# Patient Record
Sex: FEMALE | Race: WHITE
[De-identification: names, ages, dates, MRNs, and addresses within clinical notes are randomized per-mention and may not be internally consistent; named-entity substitution may affect disease eponyms.]

---

## 2019-02-26 ENCOUNTER — HOSPITAL ENCOUNTER (OUTPATIENT)
Dept: HOSPITAL 75 - RAD FS | Age: 74
End: 2019-02-26
Attending: NURSE PRACTITIONER
Payer: COMMERCIAL

## 2019-02-26 DIAGNOSIS — M19.012: Primary | ICD-10-CM

## 2019-02-26 PROCEDURE — 73030 X-RAY EXAM OF SHOULDER: CPT

## 2019-02-26 NOTE — DIAGNOSTIC IMAGING REPORT
Indication: Worsening pain.



Findings:  The severe glenohumeral arthritis with bone on bone

and joint space obliteration essentially present with articular

sclerosis, osteophytes and prominent subcortical cysts in the

humeral head.  AC joint appeared unremarkable.  No opaque loose

body is found.



Impression:  Severe arthritic changes to the glenohumeral joint



Dictated by: 



  Dictated on workstation # [id number]

## 2019-12-06 ENCOUNTER — HOSPITAL ENCOUNTER (OUTPATIENT)
Dept: HOSPITAL 75 - 4THO | Age: 74
End: 2019-12-06
Attending: EMERGENCY MEDICINE
Payer: COMMERCIAL

## 2019-12-06 ENCOUNTER — HOSPITAL ENCOUNTER (EMERGENCY)
Dept: HOSPITAL 75 - ER | Age: 74
Discharge: HOME | End: 2019-12-06
Payer: COMMERCIAL

## 2019-12-06 VITALS — HEIGHT: 57.09 IN | BODY MASS INDEX: 40 KG/M2 | WEIGHT: 185.41 LBS

## 2019-12-06 VITALS — SYSTOLIC BLOOD PRESSURE: 128 MMHG | DIASTOLIC BLOOD PRESSURE: 82 MMHG

## 2019-12-06 VITALS — DIASTOLIC BLOOD PRESSURE: 72 MMHG | SYSTOLIC BLOOD PRESSURE: 106 MMHG

## 2019-12-06 VITALS — BODY MASS INDEX: 37.84 KG/M2 | HEIGHT: 58.66 IN | WEIGHT: 185.19 LBS

## 2019-12-06 VITALS — DIASTOLIC BLOOD PRESSURE: 72 MMHG | SYSTOLIC BLOOD PRESSURE: 117 MMHG

## 2019-12-06 VITALS — DIASTOLIC BLOOD PRESSURE: 66 MMHG | SYSTOLIC BLOOD PRESSURE: 109 MMHG

## 2019-12-06 VITALS — SYSTOLIC BLOOD PRESSURE: 124 MMHG | DIASTOLIC BLOOD PRESSURE: 82 MMHG

## 2019-12-06 VITALS — DIASTOLIC BLOOD PRESSURE: 66 MMHG | SYSTOLIC BLOOD PRESSURE: 126 MMHG

## 2019-12-06 VITALS — DIASTOLIC BLOOD PRESSURE: 69 MMHG | SYSTOLIC BLOOD PRESSURE: 117 MMHG

## 2019-12-06 VITALS — DIASTOLIC BLOOD PRESSURE: 75 MMHG | SYSTOLIC BLOOD PRESSURE: 125 MMHG

## 2019-12-06 VITALS — DIASTOLIC BLOOD PRESSURE: 70 MMHG | SYSTOLIC BLOOD PRESSURE: 118 MMHG

## 2019-12-06 DIAGNOSIS — Z79.51: ICD-10-CM

## 2019-12-06 DIAGNOSIS — D64.9: Primary | ICD-10-CM

## 2019-12-06 DIAGNOSIS — E87.71: Primary | ICD-10-CM

## 2019-12-06 LAB
%HYPO/RBC NFR BLD AUTO: (no result) %
ALBUMIN SERPL-MCNC: 4.2 GM/DL (ref 3.2–4.5)
ALP SERPL-CCNC: 54 U/L (ref 40–136)
ALT SERPL-CCNC: 12 U/L (ref 0–55)
ANISOCYTOSIS BLD QL SMEAR: (no result)
APTT PPP: YELLOW S
BACTERIA #/AREA URNS HPF: NEGATIVE /HPF
BASOPHILS # BLD AUTO: 0 10^3/UL (ref 0–0.1)
BASOPHILS NFR BLD AUTO: 0 % (ref 0–10)
BASOPHILS NFR BLD MANUAL: 1 %
BILIRUB SERPL-MCNC: 0.3 MG/DL (ref 0.1–1)
BILIRUB UR QL STRIP: NEGATIVE
BUN/CREAT SERPL: 16
CALCIUM SERPL-MCNC: 9.3 MG/DL (ref 8.5–10.1)
CHLORIDE SERPL-SCNC: 107 MMOL/L (ref 98–107)
CO2 SERPL-SCNC: 27 MMOL/L (ref 21–32)
CREAT SERPL-MCNC: 0.7 MG/DL (ref 0.6–1.3)
EOSINOPHIL # BLD AUTO: 0.1 10^3/UL (ref 0–0.3)
EOSINOPHIL NFR BLD AUTO: 1 % (ref 0–10)
EOSINOPHIL NFR BLD MANUAL: 1 %
ERYTHROCYTE [DISTWIDTH] IN BLOOD BY AUTOMATED COUNT: 20.4 % (ref 10–14.5)
FIBRINOGEN PPP-MCNC: CLEAR MG/DL
GFR SERPLBLD BASED ON 1.73 SQ M-ARVRAT: > 60 ML/MIN
GLUCOSE SERPL-MCNC: 134 MG/DL (ref 70–105)
GLUCOSE UR STRIP-MCNC: NEGATIVE MG/DL
HCT VFR BLD CALC: 29 % (ref 35–52)
HGB BLD-MCNC: 7.9 G/DL (ref 11.5–16)
INR PPP: 1 (ref 0.8–1.4)
KETONES UR QL STRIP: NEGATIVE
LEUKOCYTE ESTERASE UR QL STRIP: NEGATIVE
LYMPHOCYTES # BLD AUTO: 0.8 X 10^3 (ref 1–4)
LYMPHOCYTES NFR BLD AUTO: 8 % (ref 12–44)
MANUAL DIFFERENTIAL PERFORMED BLD QL: YES
MCH RBC QN AUTO: 18 PG (ref 25–34)
MCHC RBC AUTO-ENTMCNC: 27 G/DL (ref 32–36)
MCV RBC AUTO: 68 FL (ref 80–99)
MICROCYTES BLD QL SMEAR: (no result)
MONOCYTES # BLD AUTO: 0.5 X 10^3 (ref 0–1)
MONOCYTES NFR BLD AUTO: 5 % (ref 0–12)
MONOCYTES NFR BLD: 7 %
NEUTROPHILS # BLD AUTO: 9 X 10^3 (ref 1.8–7.8)
NEUTROPHILS NFR BLD AUTO: 86 % (ref 42–75)
NEUTS BAND NFR BLD MANUAL: 83 %
NITRITE UR QL STRIP: NEGATIVE
PH UR STRIP: 7.5 [PH] (ref 5–9)
PLATELET # BLD: 265 10^3/UL (ref 130–400)
PMV BLD AUTO: 9.1 FL (ref 7.4–10.4)
POIKILOCYTOSIS BLD QL SMEAR: SLIGHT
POLYCHROMASIA BLD QL SMEAR: SLIGHT
POTASSIUM SERPL-SCNC: 3.4 MMOL/L (ref 3.6–5)
PROT SERPL-MCNC: 6.6 GM/DL (ref 6.4–8.2)
PROT UR QL STRIP: NEGATIVE
PROTHROMBIN TIME: 14.1 SEC (ref 12.2–14.7)
SODIUM SERPL-SCNC: 146 MMOL/L (ref 135–145)
SP GR UR STRIP: 1.01 (ref 1.02–1.02)
SQUAMOUS #/AREA URNS HPF: (no result) /HPF
VARIANT LYMPHS NFR BLD MANUAL: 8 %
WBC # BLD AUTO: 10.5 10^3/UL (ref 4.3–11)
WBC #/AREA URNS HPF: (no result) /HPF

## 2019-12-06 PROCEDURE — 86900 BLOOD TYPING SEROLOGIC ABO: CPT

## 2019-12-06 PROCEDURE — 85027 COMPLETE CBC AUTOMATED: CPT

## 2019-12-06 PROCEDURE — 85610 PROTHROMBIN TIME: CPT

## 2019-12-06 PROCEDURE — 36430 TRANSFUSION BLD/BLD COMPNT: CPT

## 2019-12-06 PROCEDURE — 71045 X-RAY EXAM CHEST 1 VIEW: CPT

## 2019-12-06 PROCEDURE — 86922 COMPATIBILITY TEST ANTIGLOB: CPT

## 2019-12-06 PROCEDURE — 72125 CT NECK SPINE W/O DYE: CPT

## 2019-12-06 PROCEDURE — 51701 INSERT BLADDER CATHETER: CPT

## 2019-12-06 PROCEDURE — 70450 CT HEAD/BRAIN W/O DYE: CPT

## 2019-12-06 PROCEDURE — 86870 RBC ANTIBODY IDENTIFICATION: CPT

## 2019-12-06 PROCEDURE — 36415 COLL VENOUS BLD VENIPUNCTURE: CPT

## 2019-12-06 PROCEDURE — 86902 BLOOD TYPE ANTIGEN DONOR EA: CPT

## 2019-12-06 PROCEDURE — 85007 BL SMEAR W/DIFF WBC COUNT: CPT

## 2019-12-06 PROCEDURE — 81000 URINALYSIS NONAUTO W/SCOPE: CPT

## 2019-12-06 PROCEDURE — 86850 RBC ANTIBODY SCREEN: CPT

## 2019-12-06 PROCEDURE — 80053 COMPREHEN METABOLIC PANEL: CPT

## 2019-12-06 PROCEDURE — 86901 BLOOD TYPING SEROLOGIC RH(D): CPT

## 2019-12-06 NOTE — NUR
AFTER DISCHARGE PT WILL GO TO ROOM 512 FOR OUTPATIENT BLOOD TRANSFUSION. REPORT 
TO JUAN FRANCISCO SANDERS.

## 2019-12-06 NOTE — NUR
THIS RN VERIFIED PT NAME, , MEDICAL RECORD NUMBER, BBK ID#, BLOOD TYPE, UNIT NUMBER, 
BLOOD TYPE OF UNIT, AND COMPATIBILITY W/ BLOOD BANK STAFF. 1UNIT PRBC FOR THIS PT REMOVED 
FROM BLOOD BANK BY THIS RN.

## 2019-12-06 NOTE — ED GENERAL
General


Chief Complaint:  Head/Cervical Problems


Stated Complaint:  FALL/HEAD INJ


Nursing Triage Note:  


PATIENT STATES THAT SHE FELL TWICE TODAY, FAMILY STATES THAT SHE IS HAVING 


TROUBLE PUTTING WORDS TOGETHER AND IS CONFUSED WHICH IS VERY OUT OF THE ORDINARY




FOR HER.


Nursing Sepsis Screen:  No Definite Risk


Source of Information:  Patient


Exam Limitations:  No Limitations





History of Present Illness


Date Seen by Provider:  Dec 6, 2019


Time Seen by Provider:  16:30


Initial Comments


To Er by POV with c/o fall x2 today related to left leg "giving out". Hx of low 

back pain with intermittent sciatica down left side. Has had intermittent 

confusion.


Timing/Duration:  12-24 Hours


Severity:  Moderate


Associated Systoms:  Denies Symptoms





Allergies and Home Medications


Allergies


Coded Allergies:  


     No Known Drug Allergies (Unverified , 11/5/14)





Home Medications


Calcium Carbonate/Vitamin D3 1 Each Tablet, 1 TAB PO BID, (Reported)


Diphenhydramine Hcl 25 Mg Tablet, 25 MG PO BID, (Reported)


Flaxseed 1,000 Mg Capsule, 1,000 MG PO DAILY, (Reported)


Fluticasone Propionate 16 Gm Naspr, 2 SPRAYS NSEACH DAILY, (Reported)


Gabapentin 300 Mg Capsule, 300 MG PO TID, (Reported)


Loratadine 10 Mg Capsule, 10 MG PO DAILY, (Reported)


Omega-3 Fatty Acids/Fish Oil 1 Each Capsule, 1 EACH PO DAILY, (Reported)


Oxycodone Hcl/Acetaminophen 1 Each Tablet, 1 TAB PO Q4H PRN for PAIN, (Reported)


   PRN PAIN 


Pantoprazole Sod 40 Mg Tab, 40 MG PO DAILY, (Reported)


Ranitidine Hcl 150 Mg Capsule, 150 MG PO HS, (Reported)


Simvastatin 20 Mg Tablet, 20 MG PO HS, (Reported)


Tramadol Hcl 50 Mg Tablet, 50 MG PO Q6H PRN for PAIN, (Reported)


   PRN PAIN 


Vitamin B Complex 1 Cap Capsule, 1 CAP PO DAILY, (Reported)





Patient Home Medication List


Home Medication List Reviewed:  Yes





Review of Systems


Review of Systems


Constitutional:  see HPI


EENTM:  see HPI


Respiratory:  no symptoms reported


Cardiovascular:  no symptoms reported


Genitourinary:  no symptoms reported


Musculoskeletal:  no symptoms reported


Skin:  no symptoms reported


Psychiatric/Neurological:  No Symptoms Reported


Hematologic/Lymphatic:  No Symptoms Reported





Past Medical-Social-Family Hx


Patient Social History


Alcohol Use:  Denies Use


Recreational Drug Use:  No


Smoking Status:  Never a Smoker


2nd Hand Smoke Exposure:  No


Recent Foreign Travel:  No


Contact w/Someone Who Travel:  No


Recent Infectious Disease Expo:  No





Immunizations Up To Date


Date of Pneumonia Vaccine:  Oct 5, 2011





Past Medical History


Surgeries:  Yes (breast lumpectomy)


Respiratory:  No


Cardiac:  No


Neurological:  No


Gastrointestinal:  Yes (constipation)


Musculoskeletal:  Yes


Endocrine:  Yes


Blood Disorders:  No





Physical Exam


Vital Signs





Vital Signs - First Documented








 12/6/19





 15:17


 


Temp 36.7


 


Pulse 55


 


Resp 20


 


B/P (MAP) 143/66 (91)


 


Pulse Ox 97





Capillary Refill : Less Than 3 Seconds


Height, Weight, BMI


Height: 5'"


Weight: 185lbs. oz. 83.897799ra; 37.00 BMI


Method:


General Appearance:  No Apparent Distress, WD/WN, Other (GCS 15 alert and 

oriented pleasant no confusion conversation appropriate)


Eyes:  Bilateral Eye Normal Inspection, Bilateral Eye PERRL, Bilateral Eye EOMI


HEENT:  PERRL/EOMI, TMs Normal


Neck:  Full Range of Motion, Normal Inspection


Respiratory:  No Accessory Muscle Use, No Respiratory Distress


Cardiovascular:  Regular Rate, Rhythm, Normal Peripheral Pulses


Gastrointestinal:  Normal Bowel Sounds, Non Tender, Soft


Extremity:  Normal Capillary Refill, Normal Inspection


Neurologic/Psychiatric:  Alert, Oriented x3


Skin:  Normal Color, Warm/Dry





Progress/Results/Core Measures


Suspected Sepsis


Recent Fever Within 48 Hours:  No


Infection Criteria Present:  None


New/Unexplained  Altered Menta:  Yes


Sepsis Screen:  No Definite Risk


SIRS


Temperature: 


Pulse: 55 


Respiratory Rate: 20


 


Laboratory Tests


12/6/19 15:30: White Blood Count 10.5


Blood Pressure 143 /66 


Mean: 91


 


Laboratory Tests


12/6/19 15:30: 


Creatinine 0.70, INR Comment 1.0, Platelet Count 265, Total Bilirubin 0.3








Results/Orders


Lab Results





Laboratory Tests








Test


 12/6/19


15:30 12/6/19


17:00 Range/Units


 


 


White Blood Count


 10.5 


 


 4.3-11.0


10^3/uL


 


Red Blood Count


 4.34 L


 


 4.35-5.85


10^6/uL


 


Hemoglobin 7.9 L  11.5-16.0  G/DL


 


Hematocrit 29 L  35-52  %


 


Mean Corpuscular Volume 68 L  80-99  FL


 


Mean Corpuscular Hemoglobin 18 L  25-34  PG


 


Mean Corpuscular Hemoglobin


Concent 27 L


 


 32-36  G/DL





 


Red Cell Distribution Width 20.4 H  10.0-14.5  %


 


Platelet Count


 265 


 


 130-400


10^3/uL


 


Mean Platelet Volume 9.1   7.4-10.4  FL


 


Neutrophils (%) (Auto) 86 H  42-75  %


 


Lymphocytes (%) (Auto) 8 L  12-44  %


 


Monocytes (%) (Auto) 5   0-12  %


 


Eosinophils (%) (Auto) 1   0-10  %


 


Basophils (%) (Auto) 0   0-10  %


 


Neutrophils # (Auto) 9.0 H  1.8-7.8  X 10^3


 


Lymphocytes # (Auto) 0.8 L  1.0-4.0  X 10^3


 


Monocytes # (Auto) 0.5   0.0-1.0  X 10^3


 


Eosinophils # (Auto)


 0.1 


 


 0.0-0.3


10^3/uL


 


Basophils # (Auto)


 0.0 


 


 0.0-0.1


10^3/uL


 


Neutrophils % (Manual) 83    %


 


Lymphocytes % (Manual) 8    %


 


Monocytes % (Manual) 7    %


 


Eosinophils % (Manual) 1    %


 


Basophils % (Manual) 1    %


 


Polychromasia SLIGHT    


 


Hypochromasia MODERATE    


 


Poikilocytosis SLIGHT    


 


Anisocytosis MODERATE    


 


Microcytosis MARKED    


 


Prothrombin Time 14.1   12.2-14.7  SEC


 


INR Comment 1.0   0.8-1.4  


 


Sodium Level 146 H  135-145  MMOL/L


 


Potassium Level 3.4 L  3.6-5.0  MMOL/L


 


Chloride Level 107     MMOL/L


 


Carbon Dioxide Level 27   21-32  MMOL/L


 


Anion Gap 12   5-14  MMOL/L


 


Blood Urea Nitrogen 11   7-18  MG/DL


 


Creatinine


 0.70 


 


 0.60-1.30


MG/DL


 


Estimat Glomerular Filtration


Rate > 60 


 


  





 


BUN/Creatinine Ratio 16    


 


Glucose Level 134 H    MG/DL


 


Calcium Level 9.3   8.5-10.1  MG/DL


 


Corrected Calcium 9.1   8.5-10.1  MG/DL


 


Total Bilirubin 0.3   0.1-1.0  MG/DL


 


Aspartate Amino Transf


(AST/SGOT) 13 


 


 5-34  U/L





 


Alanine Aminotransferase


(ALT/SGPT) 12 


 


 0-55  U/L





 


Alkaline Phosphatase 54     U/L


 


Total Protein 6.6   6.4-8.2  GM/DL


 


Albumin 4.2   3.2-4.5  GM/DL


 


Urine Color  YELLOW   


 


Urine Clarity  CLEAR   


 


Urine pH  7.5  5-9  


 


Urine Specific Gravity  1.010 L 1.016-1.022  


 


Urine Protein  NEGATIVE  NEGATIVE  


 


Urine Glucose (UA)  NEGATIVE  NEGATIVE  


 


Urine Ketones  NEGATIVE  NEGATIVE  


 


Urine Nitrite  NEGATIVE  NEGATIVE  


 


Urine Bilirubin  NEGATIVE  NEGATIVE  


 


Urine Urobilinogen  0.2  < = 1.0  MG/DL


 


Urine Leukocyte Esterase  NEGATIVE  NEGATIVE  


 


Urine RBC (Auto)  1+ H NEGATIVE  


 


Urine RBC  10-25 H  /HPF


 


Urine WBC  RARE   /HPF


 


Urine Squamous Epithelial


Cells 


 NONE 


  /HPF





 


Urine Crystals  NONE   /LPF


 


Urine Bacteria  NEGATIVE   /HPF


 


Urine Casts  NONE   /LPF


 


Urine Mucus  NEGATIVE   /LPF


 


Urine Culture Indicated  NO   








My Orders





Orders - BHARGAV GRAY


Ct Head/Cervical Spine Wo (12/6/19 15:47)


Chest 1 View, Ap/Pa Only (12/6/19 15:47)


Cbc With Automated Diff (12/6/19 15:47)


Comprehensive Metabolic Panel (12/6/19 15:47)


Ua Culture If Indicated (12/6/19 15:47)


Ed Iv/Invasive Line Start (12/6/19 15:47)


Protime With Inr (12/6/19 15:47)


Manual Differential (12/6/19 15:30)


Red Cells Leukocytes Reduced (12/6/19 18:19)


Type And Screen (12/6/19 18:19)





Vital Signs/I&O











 12/6/19





 15:17


 


Temp 36.7


 


Pulse 55


 


Resp 20


 


B/P (MAP) 143/66 (91)


 


Pulse Ox 97





Capillary Refill : Less Than 3 Seconds








Blood Pressure Mean:                    91


POS








Departure


Communication (Admissions)


Spoke with Dr. Wooten, we will give one unit of red cells down here in the 

emergency room, DC to home. Patient has a history of anemia, states she has had 

have blood transfusion in the past, she is taking an iron supplement.


1954-called report the patient has an antibody, it'll be one more hour before 

the blood is ready, we'll discharge her from ER since her vitals are stable, 

mentating well, and it sounds like this is chronic. I will have her go from here

up to the medical floor directly for a unit of packed red cells to be transfused

tonight outpatient on fourth floor then discharged home





Impression





   Primary Impression:  


   Symptomatic anemia


Disposition:  01 HOME, SELF-CARE


Condition:  Stable





Departure-Patient Inst.


Decision time for Depature:  18:29


Referrals:  


RENA FOSTER MD (PCP/Family)


Primary Care Physician


Patient Instructions:  NO INSTRUCTIONS GIVEN





Add. Discharge Instructions:  


1. Go directly from here to room 512 upstairs to receive a unit of red blood 

cells to be transfused, once that has transfused, barring any complications you 

will be discharged home tonight..All discharge instructions reviewed with 

patient and/or family. Voiced understanding.





Copy


Copies To 1:   RENA FOSTER MD, PETER J APRN              Dec 6, 2019 18:29


POS

## 2019-12-06 NOTE — NUR
TRANSFUSION OF 1UNIT PRBC STARTED.

-------------------------------------------------------------------------------

Addendum: 12/06/19 at 2152 by TANYA MEDLEY RN

-------------------------------------------------------------------------------

INFUSION BEGAN AT 75ML/HR FOLLOWING INFUSION OF 50ML 0.9% NORMAL SALINE.

## 2019-12-06 NOTE — DIAGNOSTIC IMAGING REPORT
INDICATION: Fall. Confusion.



FINDINGS: Portable chest. There is mild cardiomegaly. The lungs

are well aerated. There are no infiltrates. No evidence of

pulmonary edema. No pneumothorax or pleural effusion. There is

moderate scoliosis.



IMPRESSION: Cardiomegaly and scoliosis without acute change.



Dictated by: 



  Dictated on workstation # [id number]

## 2019-12-06 NOTE — DIAGNOSTIC IMAGING REPORT
PROCEDURE: CT head and CT cervical spine without contrast.



TECHNIQUE: Multiple contiguous axial images were obtained through

the brain and cervical spine without the use of intravenous

contrast. Sagittal and coronal reformations through the cervical

spine were then performed. Auto Exposure Controls were utilized

during the CT exam to meet ALARA standards for radiation dose

reduction. 



INDICATION:  Fall.



COMPARISON: No prior studies are available for comparison.



FINDINGS: 



CT HEAD:



Ventricles and sulci are within normal limits. Moderate

periventricular hypodensity is noted consistent with chronic

microvascular ischemia. There is no sulcal effacement or midline

shift. No acute intra-axial or extra-axial hemorrhage is

detected. Cisterns are patent. Visualized paranasal sinuses are

clear.



IMPRESSION: Changes of chronic microvascular ischemia. No acute

intracranial process is detected.



CT CERVICAL SPINE:



Alignment is normal. There is multilevel degenerative disc

disease with variable disc space narrowing and marginal spurring.

No fractures are seen. Prevertebral tissues are within normal

limits. Odontoid is intact.



IMPRESSION: No acute bony abnormality is detected.



 



Dictated by: 



  Dictated on workstation # [id number]

## 2019-12-06 NOTE — NUR
500ML 0.9% NORMAL SALINE REMOVED FROM OMNICELL TO RUN WITH BLOOD ADMINISTRATION. 
NON-ADMINISTERED ON E-MAR.

## 2019-12-07 VITALS — SYSTOLIC BLOOD PRESSURE: 132 MMHG | DIASTOLIC BLOOD PRESSURE: 77 MMHG

## 2019-12-07 NOTE — NUR
TRANSFUSION COMPLETE AND POSTTRANSFUSION VS STABLE. SEE VS FLOW SHEET. PT WHEELED OUT TO 
PERSONAL CONVEYANCE BY JUAN FRANCISCO HERNANDEZ WITHOUT INCIDENT. PT INSTRUCTED TO FOLLOW UP WITH DR. FOSTER.

## 2019-12-10 NOTE — NUR
LATE NOTE ENTRY:

12/06/19 AT 2100: VERBAL CONSENT OBTAINED FROM PT FOR ADMINISTRATION OF BLOOD PRODUCTS. 
VERBAL CONSENT WITNESSED BY HOUSE SUPERVISOR.

## 2020-01-01 NOTE — XMS REPORT
Encounter Summary

                             Created on: 2020



Taeyulietdirk Rebeka Leon

External Reference #: [id number]

: 1945

Sex: Female



Demographics





                          Address                   [address] [place] [address]  [address]

 

                          Home Phone                [phone]

 

                          Preferred Language        English

 

                          Marital Status            Unknown

 

                          Denominational Affiliation     Unknown

 

                          Race                      Unknown

 

                          Ethnic Group              Unknown





Author





                          Author                    [facility]

 

                          Organization              [facility]

 

                          Address                   Unknown

 

                          Phone                     Unavailable







Support





                Name            Relationship    Address         Phone

 

                Christina Winkler  [employer]            Unknown         [phone]







Care Team Providers





                    Care Team Member Name Role                Phone

 

                    Yuri Irwinwell       PCP                 [phone]







Encounter Details





                          Care Team                 Description



                     Date                Type                Department  

 

                                        



No, Ordering D, DO



[phone] (Fax)                       



                     2019          Documentation       [facility]

  



                                         Specialists  



                                         [address] [facility] [address]  



                                         [place] [address]  



                                         [phone]  







Social History





                                        Date



                 Tobacco Use     Types           Packs/Day       Years Used 

 

                                         



                                         Never Assessed    







 



                           Sex Assigned at Birth     Date Recorded

 

 



                                         Not on file 







                                        Industry



                           Job Start Date            Occupation 

 

                                        Not on file



                           Not on file               Not on file 







                                        Travel End



                           Travel History            Travel Start 

 





                                         No recent travel history available.



documented as of this encounter



Plan of Treatment





                          Care Team                 Description



                     Date                Type                Specialty  

 

                                        



Nilesh Pinon MD



[id number] [address] [address]



[phone] (Fax)                       



                     2020          Initial consult     Medical Oncology  



documented as of this encounter



Visit Diagnoses

Not on filedocumented in this encounter

## 2020-01-01 NOTE — XMS REPORT
Encounter Summary

                             Created on: 2020



Rebeka Winkler

External Reference #: [id number]

: 1945

Sex: Female



Demographics





                          Address                   [address] [place] [address]  [address]

 

                          Home Phone                [phone]

 

                          Preferred Language        English

 

                          Marital Status            Unknown

 

                          [employer] Affiliation     Unknown

 

                          Race                      Unknown

 

                          Ethnic Group              Unknown





Author





                          Author                    [facility]

 

                          Organization              [facility]

 

                          Address                   Unknown

 

                          Phone                     Unavailable







Support





                Name            Relationship    Address         Phone

 

                Christina Winkler  [employer]            Unknown         [phone]







Care Team Providers





                    Care Team Member Name Role                Phone

 

                    Artem Irwin       PCP                 [phone]







Encounter Details





                          Care Team                 Description



                     Date                Type                Department  

 

                                        



ProviderPatito MD



[address] [place] [address] [phone]                             



                     2019          Orders Only         [facility]

  



                                         Specialists  



                                         [address] [facility] [address]  



                                         [place] [address]  



                                         [phone]  







Social History





                                        Date



                 Tobacco Use     Types           Packs/Day       Years Used 

 

                                         



                                         Never Assessed    







 



                           Sex Assigned at Birth     Date Recorded

 

 



                                         Not on file 







                                        Industry



                           Job Start Date            Occupation 

 

                                        Not on file



                           Not on file               Not on file 







                                        Travel End



                           Travel History            Travel Start 

 





                                         No recent travel history available.



documented as of this encounter



Plan of Treatment





                          Care Team                 Description



                     Date                Type                Specialty  

 

                                        



Nilesh Pinon MD



[address] [address] [id number] [phone] (Fax)                       



                     2020          Initial consult     Medical Oncology  



documented as of this encounter



Procedures





                                        Comments



                 Procedure Name  Priority        Date/Time       Associated Diag

nosis 

 

                                         



                     LAB OUTSIDE RECORD  Routine             2019  



documented in this encounter



Results

* Lab Outside Record (2019)







                                         Specimen

 





                                         Blood







 



                           Narrative                 Performed At

 

 



                                         This result has an attachment that is n

ot available. 





documented in this encounter



Visit Diagnoses

Not on filedocumented in this encounter

## 2020-01-01 NOTE — XMS REPORT
Clinical Summary

                             Created on: 2020



Taeyulietdirk Rebeka Leon

External Reference #: [id number]

: 1945

Sex: Female



Demographics





                          Address                   [address] [place] [address]  [address]

 

                          Home Phone                [phone]

 

                          Preferred Language        English

 

                          Marital Status            Unknown

 

                          Yazdanism Affiliation     Unknown

 

                          Race                      Unknown

 

                          Ethnic Group              Unknown





Author





                          Author                    [facility]

 

                          Organization              [facility]

 

                          Address                   Unknown

 

                          Phone                     Unavailable







Support





                Name            Relationship    Address         Phone

 

                Christina Winkler  [employer]            Unknown         [phone]







Care Team Providers





                    Care Team Member Name Role                Phone

 

                    Yuri Irwinwell       PCP                 [phone]







Allergies

Not on File



Medications

Not on file



Active Problems





Not on file



Encounters





                          Care Team                 Description



                     Date                Type                Specialty  

 

                                        



Patito Allen MD                 



                     2019          Orders Only         Medical Oncology  

 

                                        



No, Ordering D, DO                       



                     2019          Documentation       Medical Oncology  



from Last 3 Months



Social History





                                        Date



                 Tobacco Use     Types           Packs/Day       Years Used 

 

                                         



                                         Never Assessed    







 



                           Sex Assigned at Birth     Date Recorded

 

 



                                         Not on file 







                                        Industry



                           Job Start Date            Occupation 

 

                                        Not on file



                           Not on file               Not on file 







                                        Travel End



                           Travel History            Travel Start 

 





                                         No recent travel history available.







Last Filed Vital Signs

Not on file



Plan of Treatment





                          Care Team                 Description



                     Date                Type                Specialty  

 

                                        



Nilesh Pinon MD



[address] [address] [address] [phone] (Fax)                       



                     2020          Initial consult     Medical Oncology  







   



                 Health Maintenance  Due Date        Last Done       Comments

 

   



                           Td #                      1945  

 

   



                           Colorectal Screening via  1994  



                                         Colonoscopy   

 

   



                           Mammogram Screening       1994  

 

   



                           Zoster Vaccine# (1 of 2)  1994  

 

   



                           Fall Risk Assessment #    2009  

 

   



                           Osteoporosis Screening    2009  

 

   



                           Pneumococcal Vaccine: 65+  2009  



                                         Years (1 of 2 - PCV13)   

 

   



                           Influenza Vaccine (#1)    2019  







Procedures





                                        Comments



                 Procedure Name  Priority        Date/Time       Associated Diag

nosis 

 

                                         



                     LAB OUTSIDE RECORD  Routine             2019  



from Last 3 Months



Results

* Lab Outside Record (2019)







                                         Specimen

 





                                         Blood







 



                           Narrative                 Performed At

 

 



                                         This result has an attachment that is n

ot available. 





from Last 3 Months



Insurance





                                        Type



            Payer      Benefit    Subscriber ID  Effective  Phone      Address 



                           Plan /                    Dates   



                                         Group     

 

                                        Medicare



              MEDICARE     MEDICARE     xxxxxxxxxxx  [id number]   [place] 



                     PART A              [facility]           xxxxxxxxx       

8-P   



                           FEDERAL                   resent   







     



            Guarantor Name  Account    Relation to  Date of    Phone      Shanique pressley Address



                     Type                Patient             Birth  

 

     



            Rebeka Winkler  Personal/F  Self       1945  [phone]

[birth year]  [phone]



                     [address]               (Home)              [place] [address]

1

 

     



            Rebeka Winkler  Personal/F  Self       1945  [phone]

[birth year]  [phone]



                     amily               (Home)              [place] [address]

1







Advance Directives





For more information, please contact: [phone]







                          Patient Representative    Explanation



                           Type                      Date Recorded  

 

                                                     



                                         Advance Directives   



                                         and Living Will   

 

                                                     



                                         Power of

## 2020-02-11 ENCOUNTER — HOSPITAL ENCOUNTER (OUTPATIENT)
Dept: HOSPITAL 75 - LAB | Age: 75
End: 2020-02-11
Attending: INTERNAL MEDICINE
Payer: COMMERCIAL

## 2020-02-11 DIAGNOSIS — C50.212: Primary | ICD-10-CM

## 2020-02-11 LAB
ALBUMIN SERPL-MCNC: 4.1 GM/DL (ref 3.2–4.5)
ALP SERPL-CCNC: 44 U/L (ref 40–136)
ALT SERPL-CCNC: 20 U/L (ref 0–55)
BASOPHILS # BLD AUTO: 0 10^3/UL (ref 0–0.1)
BASOPHILS NFR BLD AUTO: 1 % (ref 0–10)
BILIRUB SERPL-MCNC: 0.4 MG/DL (ref 0.1–1)
BUN/CREAT SERPL: 14
CALCIUM SERPL-MCNC: 10 MG/DL (ref 8.5–10.1)
CHLORIDE SERPL-SCNC: 104 MMOL/L (ref 98–107)
CO2 SERPL-SCNC: 29 MMOL/L (ref 21–32)
CREAT SERPL-MCNC: 0.7 MG/DL (ref 0.6–1.3)
EOSINOPHIL # BLD AUTO: 0.2 10^3/UL (ref 0–0.3)
EOSINOPHIL NFR BLD AUTO: 3 % (ref 0–10)
ERYTHROCYTE [DISTWIDTH] IN BLOOD BY AUTOMATED COUNT: 18.8 % (ref 10–14.5)
GFR SERPLBLD BASED ON 1.73 SQ M-ARVRAT: > 60 ML/MIN
GLUCOSE SERPL-MCNC: 100 MG/DL (ref 70–105)
HCT VFR BLD CALC: 39 % (ref 35–52)
HGB BLD-MCNC: 11.8 G/DL (ref 11.5–16)
LYMPHOCYTES # BLD AUTO: 1 X 10^3 (ref 1–4)
LYMPHOCYTES NFR BLD AUTO: 16 % (ref 12–44)
MANUAL DIFFERENTIAL PERFORMED BLD QL: NO
MCH RBC QN AUTO: 25 PG (ref 25–34)
MCHC RBC AUTO-ENTMCNC: 31 G/DL (ref 32–36)
MCV RBC AUTO: 83 FL (ref 80–99)
MONOCYTES # BLD AUTO: 0.4 X 10^3 (ref 0–1)
MONOCYTES NFR BLD AUTO: 7 % (ref 0–12)
NEUTROPHILS # BLD AUTO: 4.6 X 10^3 (ref 1.8–7.8)
NEUTROPHILS NFR BLD AUTO: 73 % (ref 42–75)
PLATELET # BLD: 223 10^3/UL (ref 130–400)
PMV BLD AUTO: 9 FL (ref 7.4–10.4)
POTASSIUM SERPL-SCNC: 4.2 MMOL/L (ref 3.6–5)
PROT SERPL-MCNC: 7 GM/DL (ref 6.4–8.2)
SODIUM SERPL-SCNC: 142 MMOL/L (ref 135–145)
WBC # BLD AUTO: 6.3 10^3/UL (ref 4.3–11)

## 2020-02-11 PROCEDURE — 82378 CARCINOEMBRYONIC ANTIGEN: CPT

## 2020-02-11 PROCEDURE — 85025 COMPLETE CBC W/AUTO DIFF WBC: CPT

## 2020-02-11 PROCEDURE — 86300 IMMUNOASSAY TUMOR CA 15-3: CPT

## 2020-02-11 PROCEDURE — 36415 COLL VENOUS BLD VENIPUNCTURE: CPT

## 2020-02-11 PROCEDURE — 80053 COMPREHEN METABOLIC PANEL: CPT

## 2022-03-30 ENCOUNTER — HOSPITAL ENCOUNTER (OUTPATIENT)
Dept: HOSPITAL 75 - PREOP | Age: 77
End: 2022-03-30
Attending: SURGERY
Payer: COMMERCIAL

## 2022-03-30 VITALS — HEIGHT: 57.99 IN | BODY MASS INDEX: 22.68 KG/M2 | WEIGHT: 108.03 LBS

## 2022-03-30 DIAGNOSIS — Z01.818: Primary | ICD-10-CM

## 2022-03-31 ENCOUNTER — HOSPITAL ENCOUNTER (OUTPATIENT)
Dept: HOSPITAL 75 - SDC | Age: 77
End: 2022-03-31
Attending: SURGERY
Payer: COMMERCIAL

## 2022-03-31 VITALS — SYSTOLIC BLOOD PRESSURE: 96 MMHG | DIASTOLIC BLOOD PRESSURE: 65 MMHG

## 2022-03-31 VITALS — SYSTOLIC BLOOD PRESSURE: 103 MMHG | DIASTOLIC BLOOD PRESSURE: 43 MMHG

## 2022-03-31 VITALS — DIASTOLIC BLOOD PRESSURE: 58 MMHG | SYSTOLIC BLOOD PRESSURE: 117 MMHG

## 2022-03-31 VITALS — SYSTOLIC BLOOD PRESSURE: 117 MMHG | DIASTOLIC BLOOD PRESSURE: 58 MMHG

## 2022-03-31 VITALS — BODY MASS INDEX: 22.68 KG/M2 | HEIGHT: 57.99 IN | WEIGHT: 108.03 LBS

## 2022-03-31 VITALS — DIASTOLIC BLOOD PRESSURE: 60 MMHG | SYSTOLIC BLOOD PRESSURE: 100 MMHG

## 2022-03-31 VITALS — DIASTOLIC BLOOD PRESSURE: 96 MMHG | SYSTOLIC BLOOD PRESSURE: 122 MMHG

## 2022-03-31 VITALS — DIASTOLIC BLOOD PRESSURE: 97 MMHG | SYSTOLIC BLOOD PRESSURE: 116 MMHG

## 2022-03-31 VITALS — DIASTOLIC BLOOD PRESSURE: 62 MMHG | SYSTOLIC BLOOD PRESSURE: 118 MMHG

## 2022-03-31 VITALS — SYSTOLIC BLOOD PRESSURE: 105 MMHG | DIASTOLIC BLOOD PRESSURE: 55 MMHG

## 2022-03-31 VITALS — SYSTOLIC BLOOD PRESSURE: 104 MMHG | DIASTOLIC BLOOD PRESSURE: 31 MMHG

## 2022-03-31 VITALS — DIASTOLIC BLOOD PRESSURE: 52 MMHG | SYSTOLIC BLOOD PRESSURE: 102 MMHG

## 2022-03-31 VITALS — SYSTOLIC BLOOD PRESSURE: 152 MMHG | DIASTOLIC BLOOD PRESSURE: 87 MMHG

## 2022-03-31 DIAGNOSIS — Z87.891: ICD-10-CM

## 2022-03-31 DIAGNOSIS — E11.9: ICD-10-CM

## 2022-03-31 DIAGNOSIS — K82.8: ICD-10-CM

## 2022-03-31 DIAGNOSIS — K80.10: Primary | ICD-10-CM

## 2022-03-31 PROCEDURE — 94664 DEMO&/EVAL PT USE INHALER: CPT

## 2022-03-31 PROCEDURE — 87081 CULTURE SCREEN ONLY: CPT

## 2022-03-31 RX ADMIN — SODIUM CHLORIDE, SODIUM LACTATE, POTASSIUM CHLORIDE, AND CALCIUM CHLORIDE PRN MLS/HR: 600; 310; 30; 20 INJECTION, SOLUTION INTRAVENOUS at 11:59

## 2022-03-31 RX ADMIN — SODIUM CHLORIDE, SODIUM LACTATE, POTASSIUM CHLORIDE, AND CALCIUM CHLORIDE PRN MLS/HR: 600; 310; 30; 20 INJECTION, SOLUTION INTRAVENOUS at 15:20

## 2022-03-31 NOTE — ANESTHESIA-GENERAL POST-OP
General


Patient Condition


Mental Status/LOC:  Same as Preop


Cardiovascular:  Satisfactory


Nausea/Vomiting:  Absent


Respiratory:  Satisfactory


Pain:  Controlled


Complications:  Absent





Post Op Complications


Complications


None





Follow Up Care/Instructions


Patient Instructions


None needed.





Anesthesia/Patient Condition


Patient Condition


Patient is doing well, no complaints, stable vital signs, no apparent adverse 

anesthesia problems.   


No complications reported per nursing.











RAVINDER LICEA CRNA            Mar 31, 2022 16:03

## 2022-03-31 NOTE — PROGRESS NOTE-PRE OPERATIVE
Pre-Operative Progress Note


H&P Reviewed


The H&P was reviewed, patient examined and no changes noted.


Date Seen by Provider:  Mar 31, 2022


Time Seen by Provider:  12:30


Date H&P Reviewed:  Mar 31, 2022


Time H&P Reviewed:  12:25


Pre-Operative Diagnosis:  Symptomatic Chronic calculous cholecysitis











ISI HERRING          Mar 31, 2022 12:31

## 2022-03-31 NOTE — DISCHARGE INST-SURGICAL
D/C Lap Instructions-KIDO


Reconcile Patient Problems


Problems Reviewed?:  Yes


New, Converted, or Re-Newed RX:  RX on Chart


Follow Up Appt in 2 weeks





Activity as tolerated


No driving for 24 hours


No driving while on pain medications





Incentive Spirometry use every 2 hours while awake





Regular Diet





Symptoms to Report: Fever over 101 degree F, Nausea/Vomiting 


Infection Signs and Symptoms to report:  Increased redness, Foul odor of wound, 

Increased drainage





Bathing instructions: May shower


Operative Area Clean/Dry;  Keep incision clean/dry





If any problems/questions: Contact your physician or go to Emergency Room











ISI HERRING          Mar 31, 2022 12:29

## 2022-03-31 NOTE — PROGRESS NOTE-POST OPERATIVE
Post-Operative Progess Note


Surgeon (s)/Assistant (s)


Surgeon


AZALEA SEQUEIRA MD


Assistant:  roland mitchell APRN





Pre-Operative Diagnosis


Symptomatic Chronic calculous cholecysitis





Post-Operative Diagnosis





same





Procedure & Operative Findings


Date of Procedure


3/31/22


Procedure Performed/Findings


laparoscopic cholecystectomy


Anesthesia Type


get





Estimated Blood Loss


Estimated blood loss (mL):  minimal





Specimens/Packing


Specimens Removed


gallbladder











AZALEA SEQUEIRA MD                Mar 31, 2022 15:52

## 2022-03-31 NOTE — OPERATIVE REPORT
DATE OF SERVICE:  03/31/2022



ATTENDING PRIMARY CARE PHYSICIAN:

Dr. Artem Irwin.



PREOPERATIVE DIAGNOSIS:

Symptomatic chronic calculous cholecystitis.



POSTOPERATIVE DIAGNOSIS:

Hydrops of the gallbladder.



PROCEDURE:

Laparoscopic cholecystectomy.



SURGEON:

Azalea Sequeira MD



ASSISTANT:

CORTEZ Schuler.



ANESTHESIA:

General endotracheal.



ESTIMATED BLOOD LOSS:

Minimal.



FINDINGS:

A stone at the cystic duct and hydrops of the gallbladder.



DISPOSITION:

The patient tolerated the procedure well.



INDICATIONS:

The patient is a 77-year-old female who has had a several-year history of what

she felt was acid indigestion and gastritis; however, she states that the pain

became more significant and radiated towards the back.  She also had reported

episodes of nausea and vomiting, which became much more frequent as well as more

severe.  She was seen by her physician.  An ultrasound performed, which did show

a stone wedged into the cystic duct consistent with chronic calculous

cholecystitis.



DESCRIPTION OF PROCEDURE:

The patient was brought to the operating room, laid supine on the table.  After

adequate IV pain and sedative medications and general endotracheal intubation,

the abdomen was prepped and draped in standard surgical fashion.



A 0.5% Marcaine with epinephrine was used to anesthetize overlying skin in the

left upper abdominal quadrant and a transverse skin incision made using a 15

blade.  An 0 silk suture was applied to the medial aspect of incision for

retraction and a Veress needle inserted with a low opening pressure of 0 mmHg

and the abdomen was then insufflated to 15 mmHg pressure.  The Veress needle

removed and a 5 mm XL trocar placed followed by a 5 mm 45-degree angle

laparoscope visualizing the peritoneal cavity.



A 4-quadrant abdominal exploration was performed.  There was a distended

gallbladder with mild gallbladder wall thickening.  Under direct visualization,

we then proceeded to place a supraumbilical 10 mm port after the skin and

peritoneal lining were anesthetized using 0.5% Marcaine with epinephrine and a

transverse skin incision made using a 15 blade.  In a similar manner, a right

upper abdominal quadrant 5 mm port was placed.



The patient was then placed in reverse Trendelenburg position as well as plane

right side up, left side down.  The fundus of the gallbladder was then retracted

anteriorly and superiorly.  The hepatoduodenal ligament was then dissected using

blunt dissection as well as electrocautery using the hook instrument as well as

a [place] dissector.  The entire critical view of safety was identified

including the triangle of Calot as well as the cystic duct and artery as the

only two structures going into the gallbladder as well as cystic plate behind

the proximal gallbladder.  A timeout was then taken and the cystic duct and

artery were then clipped proximally and distally and cut with EndoShears.  The

gallbladder was then dissected off the liver bed using electrocautery and hook

instrument with visualization of good hemostasis as well as no leaking ducts of

Luschka.  The gallbladder was removed through the 10 mm port site using an

EndoCatch bag.



The 10 mm port site fascia and peritoneum were then closed under direct

visualization using a Darshan-Georgina device and 0 Vicryl suture.  The abdomen

was desufflated and remaining ports removed.  All skin incisions were closed

using 4-0 Monocryl running subcuticular sutures.  Wounds were then cleaned and

covered with Dermabond.



The patient tolerated the procedure well.  We will start IV normal pain

medication as well as a clear liquid diet.  Once she is tolerating clears, has

good pain control with oral pain medications, ambulating well, we will discharge

her home where she will be instructed to do no heavy lifting or exertion for the

next two weeks.





Job ID: [id number]

DocumentID: [id number]

Dictated Date:  03/31/2022 15:58:24

Transcription Date: 03/31/2022 20:46:34

Dictated By: AZALEA SEQUEIRA MD

## 2022-04-30 ENCOUNTER — HOSPITAL ENCOUNTER (INPATIENT)
Dept: HOSPITAL 75 - ICU | Age: 77
LOS: 11 days | Discharge: NURSING, REHAB OR LONG-TERM CARE | DRG: 871 | End: 2022-05-11
Attending: SURGERY | Admitting: SURGERY
Payer: MEDICARE

## 2022-04-30 VITALS — HEIGHT: 59.02 IN | BODY MASS INDEX: 26.93 KG/M2 | WEIGHT: 133.6 LBS

## 2022-04-30 DIAGNOSIS — A41.9: Primary | ICD-10-CM

## 2022-04-30 DIAGNOSIS — L89.152: ICD-10-CM

## 2022-04-30 DIAGNOSIS — R64: ICD-10-CM

## 2022-04-30 DIAGNOSIS — D64.9: ICD-10-CM

## 2022-04-30 DIAGNOSIS — K65.2: ICD-10-CM

## 2022-04-30 DIAGNOSIS — R18.8: ICD-10-CM

## 2022-04-30 DIAGNOSIS — Z85.3: ICD-10-CM

## 2022-04-30 DIAGNOSIS — H54.7: ICD-10-CM

## 2022-04-30 DIAGNOSIS — K74.60: ICD-10-CM

## 2022-04-30 DIAGNOSIS — Z66: ICD-10-CM

## 2022-04-30 DIAGNOSIS — I10: ICD-10-CM

## 2022-04-30 DIAGNOSIS — E78.00: ICD-10-CM

## 2022-04-30 DIAGNOSIS — M19.91: ICD-10-CM

## 2022-04-30 DIAGNOSIS — J44.1: ICD-10-CM

## 2022-04-30 DIAGNOSIS — J96.01: ICD-10-CM

## 2022-04-30 DIAGNOSIS — Z51.5: ICD-10-CM

## 2022-04-30 DIAGNOSIS — Z87.891: ICD-10-CM

## 2022-04-30 DIAGNOSIS — Z98.1: ICD-10-CM

## 2022-04-30 DIAGNOSIS — E43: ICD-10-CM

## 2022-04-30 LAB
ALBUMIN SERPL-MCNC: 2.1 GM/DL (ref 3.2–4.5)
ALP SERPL-CCNC: 746 U/L (ref 40–136)
ALT SERPL-CCNC: 10 U/L (ref 0–55)
APTT PPP: (no result) S
BACTERIA #/AREA URNS HPF: (no result) /HPF
BASOPHILS # BLD AUTO: 0.1 10^3/UL (ref 0–0.1)
BASOPHILS NFR BLD AUTO: 0 % (ref 0–10)
BILIRUB SERPL-MCNC: 2.6 MG/DL (ref 0.1–1)
BILIRUB UR QL STRIP: (no result)
BUN/CREAT SERPL: 25
CALCIUM SERPL-MCNC: 8.1 MG/DL (ref 8.5–10.1)
CHLORIDE SERPL-SCNC: 104 MMOL/L (ref 98–107)
CO2 SERPL-SCNC: 18 MMOL/L (ref 21–32)
CREAT SERPL-MCNC: 1.52 MG/DL (ref 0.6–1.3)
EOSINOPHIL # BLD AUTO: 0.2 10^3/UL (ref 0–0.3)
EOSINOPHIL NFR BLD AUTO: 1 % (ref 0–10)
FIBRINOGEN PPP-MCNC: CLEAR MG/DL
GFR SERPLBLD BASED ON 1.73 SQ M-ARVRAT: 35 ML/MIN
GLUCOSE SERPL-MCNC: 103 MG/DL (ref 70–105)
GLUCOSE UR STRIP-MCNC: (no result) MG/DL
HCT VFR BLD CALC: 27 % (ref 35–52)
HGB BLD-MCNC: 8.3 G/DL (ref 11.5–16)
KETONES UR QL STRIP: (no result)
LEUKOCYTE ESTERASE UR QL STRIP: (no result)
LYMPHOCYTES # BLD AUTO: 0.6 10^3/UL (ref 1–4)
LYMPHOCYTES NFR BLD AUTO: 2 % (ref 12–44)
MAGNESIUM SERPL-MCNC: 1.9 MG/DL (ref 1.6–2.4)
MANUAL DIFFERENTIAL PERFORMED BLD QL: YES
MCH RBC QN AUTO: 23 PG (ref 25–34)
MCHC RBC AUTO-ENTMCNC: 30 G/DL (ref 32–36)
MCV RBC AUTO: 76 FL (ref 80–99)
MONOCYTES # BLD AUTO: 0.5 10^3/UL (ref 0–1)
MONOCYTES NFR BLD AUTO: 2 % (ref 0–12)
MONOCYTES NFR BLD: 4 %
NEUTROPHILS # BLD AUTO: 22.9 10^3/UL (ref 1.8–7.8)
NEUTROPHILS NFR BLD AUTO: 94 % (ref 42–75)
NEUTS BAND NFR BLD MANUAL: 94 %
NITRITE UR QL STRIP: NEGATIVE
PH UR STRIP: 5 [PH] (ref 5–9)
PLATELET # BLD: 635 10^3/UL (ref 130–400)
PMV BLD AUTO: 9.1 FL (ref 9–12.2)
POLYCHROMASIA BLD QL SMEAR: (no result)
POTASSIUM SERPL-SCNC: 5.5 MMOL/L (ref 3.6–5)
PROT SERPL-MCNC: 5.5 GM/DL (ref 6.4–8.2)
PROT UR QL STRIP: (no result)
RBC #/AREA URNS HPF: (no result) /HPF
RENAL EPI CELLS #/AREA URNS HPF: (no result) /HPF
SODIUM SERPL-SCNC: 137 MMOL/L (ref 135–145)
SP GR UR STRIP: 1.01 (ref 1.02–1.02)
TARGETS BLD QL SMEAR: SLIGHT
VARIANT LYMPHS NFR BLD MANUAL: 2 %
WBC # BLD AUTO: 24.5 10^3/UL (ref 4.3–11)

## 2022-04-30 PROCEDURE — 87088 URINE BACTERIA CULTURE: CPT

## 2022-04-30 PROCEDURE — 85007 BL SMEAR W/DIFF WBC COUNT: CPT

## 2022-04-30 PROCEDURE — 81000 URINALYSIS NONAUTO W/SCOPE: CPT

## 2022-04-30 PROCEDURE — 83986 ASSAY PH BODY FLUID NOS: CPT

## 2022-04-30 PROCEDURE — 82805 BLOOD GASES W/O2 SATURATION: CPT

## 2022-04-30 PROCEDURE — 86901 BLOOD TYPING SEROLOGIC RH(D): CPT

## 2022-04-30 PROCEDURE — 89051 BODY FLUID CELL COUNT: CPT

## 2022-04-30 PROCEDURE — 71045 X-RAY EXAM CHEST 1 VIEW: CPT

## 2022-04-30 PROCEDURE — 88112 CYTOPATH CELL ENHANCE TECH: CPT

## 2022-04-30 PROCEDURE — 36415 COLL VENOUS BLD VENIPUNCTURE: CPT

## 2022-04-30 PROCEDURE — 87070 CULTURE OTHR SPECIMN AEROBIC: CPT

## 2022-04-30 PROCEDURE — 85025 COMPLETE CBC W/AUTO DIFF WBC: CPT

## 2022-04-30 PROCEDURE — 74018 RADEX ABDOMEN 1 VIEW: CPT

## 2022-04-30 PROCEDURE — 84100 ASSAY OF PHOSPHORUS: CPT

## 2022-04-30 PROCEDURE — 88305 TISSUE EXAM BY PATHOLOGIST: CPT

## 2022-04-30 PROCEDURE — 87205 SMEAR GRAM STAIN: CPT

## 2022-04-30 PROCEDURE — 76942 ECHO GUIDE FOR BIOPSY: CPT

## 2022-04-30 PROCEDURE — 87081 CULTURE SCREEN ONLY: CPT

## 2022-04-30 PROCEDURE — 86870 RBC ANTIBODY IDENTIFICATION: CPT

## 2022-04-30 PROCEDURE — 86900 BLOOD TYPING SEROLOGIC ABO: CPT

## 2022-04-30 PROCEDURE — 83735 ASSAY OF MAGNESIUM: CPT

## 2022-04-30 PROCEDURE — 83605 ASSAY OF LACTIC ACID: CPT

## 2022-04-30 PROCEDURE — 94640 AIRWAY INHALATION TREATMENT: CPT

## 2022-04-30 PROCEDURE — 84157 ASSAY OF PROTEIN OTHER: CPT

## 2022-04-30 PROCEDURE — 80053 COMPREHEN METABOLIC PANEL: CPT

## 2022-04-30 PROCEDURE — 83615 LACTATE (LD) (LDH) ENZYME: CPT

## 2022-04-30 PROCEDURE — 85610 PROTHROMBIN TIME: CPT

## 2022-04-30 PROCEDURE — 87040 BLOOD CULTURE FOR BACTERIA: CPT

## 2022-04-30 PROCEDURE — 86850 RBC ANTIBODY SCREEN: CPT

## 2022-04-30 PROCEDURE — 74176 CT ABD & PELVIS W/O CONTRAST: CPT

## 2022-04-30 PROCEDURE — 94760 N-INVAS EAR/PLS OXIMETRY 1: CPT

## 2022-04-30 PROCEDURE — 82945 GLUCOSE OTHER FLUID: CPT

## 2022-04-30 PROCEDURE — 82150 ASSAY OF AMYLASE: CPT

## 2022-04-30 PROCEDURE — 82947 ASSAY GLUCOSE BLOOD QUANT: CPT

## 2022-04-30 PROCEDURE — 85027 COMPLETE CBC AUTOMATED: CPT

## 2022-04-30 PROCEDURE — 86922 COMPATIBILITY TEST ANTIGLOB: CPT

## 2022-04-30 RX ADMIN — SODIUM CHLORIDE SCH MLS/HR: 900 INJECTION INTRAVENOUS at 22:40

## 2022-04-30 RX ADMIN — FENTANYL CITRATE PRN MCG: 50 INJECTION, SOLUTION INTRAMUSCULAR; INTRAVENOUS at 22:40

## 2022-04-30 NOTE — PROGRESS NOTE-PRE OPERATIVE
Pre-Operative Progress Note


H&P Reviewed


The H&P was reviewed, patient examined and no changes noted.


Date Seen by Provider:  Apr 30, 2022


Time Seen by Provider:  20:30


Date H&P Reviewed:  Apr 30, 2022


Time H&P Reviewed:  20:30


Pre-Operative Diagnosis:  abdominal pain/distention, leukocytosis











AZALEA SEQUEIRA MD                Apr 30, 2022 20:30

## 2022-04-30 NOTE — DIAGNOSTIC IMAGING REPORT
PROCEDURE: CT abdomen and pelvis without contrast.



TECHNIQUE: Multiple contiguous axial images were obtained through

the abdomen and pelvis without the use of intravenous contrast.

Auto Exposure Controls were utilized during the CT exam to meet

ALARA standards for radiation dose reduction. 



INDICATION: Sepsis. Abdominal distention. Concern for bowel

obstruction.



COMPARISON: None.

 

FINDINGS: The heart is unremarkable. Patchy and consolidative

opacities are seen in the bilateral lung bases. Large hiatal

hernia is present.



There is a micronodular contour of the liver. No focal hepatic

lesions are seen. The gallbladder is surgically absent. A large

volume of ascites is seen in the abdomen and pelvis.



The spleen, pancreas, adrenal glands and kidneys have a normal

appearance. There is no pathologically enlarged mesenteric or

retroperitoneal adenopathy. 



The bowel loops are nondilated. The appendix is visualized in the

right lower quadrant has a normal appearance. There is no free

air. 



No acute osseous abnormalities. Posterior fusion changes are

visualized in the lower lumbar spine. There is left convexity

curvature of the lumbar spine. There is calcified aortic and

iliac atherosclerotic plaque without aneurysm.



The urinary bladder is decompressed with a Staley in place.



IMPRESSION: 

1. Large volume of ascites in the abdomen and pelvis.

2. Cirrhotic morphology of the liver. No focal hepatic lesions.

Consider liver protocol CT on an outpatient basis to screen for

hepatocellular carcinoma.

3. Patchy and consolidative opacities in the bilateral lung

bases. Findings can be seen with atelectasis and/or infection.

4. Large hiatal hernia.



Dictated by: 



  Dictated on workstation # [id number]

## 2022-04-30 NOTE — HISTORY AND PHYSICAL
DATE OF SERVICE:  



ATTENDING PRIMARY CARE PHYSICIAN:

Dr. Artem Irwin.



HISTORY OF PRESENT ILLNESS:

The patient is a 77-year-old female, we had seen recently.  She was referred

over to us for several year history of what she felt was acid indigestion and

gastritis; however, this became more significant and she also developed

radiation of pain towards the back.  This was also associated with nausea and

vomiting.  An ultrasound was performed, which did show a stone wedged in the

cystic duct consistent with chronic calculous cholecystitis.  On 03/31/2022, she

underwent a laparoscopic cholecystectomy, was found to have hydrops of the

gallbladder.  She did well after surgery and was sent home later that day.



Emergency Department physician at [facility] contacted us

regarding a 1-day history of abdominal pain and distention.  She had some

laboratory work done and was also found to have a significantly elevated white

count at 32.9.  She was also found to be anemic with a hemoglobin of 7.6 and

hematocrit 28.2.  Due to prerenal azotemia and dehydration, she also did have

elevated BUN and creatinine at 43 and 1.66 respectively.  Her liver function

enzymes were normal.  Chest x-ray and abdominal x-ray were performed, which did

show some dilated loops of small bowel, which may indicate a potential partial

small-bowel obstruction.  The patient was transferred to our institution due to

a lack of general surgery coverage at the initial hospital.  Upon examination,

the patient does have some abdominal distention; however, since being admitted,

she has had two large liquid bowel movements.  She does have some abdominal

discomfort, which is more diffuse.  There are no hernias, no peritoneal signs. 

She does report some nausea; however, no vomiting.



PAST MEDICAL HISTORY:

Hypertension, hypercholesterolemia, degenerative joint disease, history of

anemia, left breast cancer x2.



PAST SURGICAL HISTORY:

Left shoulder surgery 21, left lumbar spinal fusion 14, breast lumpectomy 96 and

18 tonsillectomy.



ALLERGIES:

No known drug allergies.



MEDICATIONS:

Baclofen 10 mg daily, iron 150 mg daily, anastrozole 1 mg daily, morphine

sulfate 15 mg b.i.d., meloxicam 15 mg daily, amlodipine 5 mg daily, lisinopril

10 mg daily.



SOCIAL HISTORY:

Previous smoker, 50 pack years, quit in 1996.  She was drinking alcohol on a

daily basis.



FAMILY HISTORY:

Father, cerebrovascular accident, myocardial infarction.  Mother, breast cancer.



VITAL SIGNS:

Stable.  Systolic blood pressure in the 100s, heart rate 90, pulse ox mid 90s on

2 liters nasal cannula.



REVIEW OF SYSTEMS:

This is an elderly female, who is slightly confused; however, does answer some

questions appropriately.  She states that she had a sudden onset of abdominal

pain starting this morning.  She reports that after her laparoscopic

cholecystectomy.  She had done well and did not have any issues.  She states

that she developed abdominal distention, crampy pain as well as nausea; however,

no vomiting.  No cough or sputum production.  She did have two loose bowel

movements.  No red blood per rectum, no dark tarry stools.  No fever, chills, no

recent inadvertent weight loss.  All other review of systems negative.



PHYSICAL EXAMINATION:

CHEST:  Few scattered rales bilaterally.

HEART:  Regular, no murmurs.

EXTREMITIES:  No lower extremity edema, negative Homans sign.

HEENT:  No scleral icterus.

NECK:  No cervical lymphadenopathy.

ABDOMEN:  Distended, mild-to-moderate diffuse tenderness, voluntary guarding, no

rebound.  No hernias palpable.

SKIN:  Warm, dry.



ASSESSMENT AND PLAN:

A 77-year-old female with a partial small-bowel obstruction with a potential

intraabdominal infection.  We will repeat labs.  A CT scan at her previous

institution was not performed due to her dehydration and low GFR.  If this does

improve, we will proceed with a CT scan of the abdomen and pelvis to further

delineate a possible etiology of her symptoms.  She also does have a significant

leukocytosis and we will empirically start her on Zosyn.  If intraabdominal

abscess is identified, we will then proceed with likely a diagnostic laparoscopy

as well as suction irrigation of any fluid collection or abscess as well as

intraperitoneal drain placement.





Job ID: [id number]

DocumentID: [id number]

Dictated Date:  04/30/2022 20:12:33

Transcription Date: 04/30/2022 21:49:33

Dictated By: AZALEA SEQUEIRA MD

[facility]

## 2022-05-01 LAB
ALBUMIN SERPL-MCNC: 2.1 GM/DL (ref 3.2–4.5)
ALBUMIN SERPL-MCNC: 2.1 GM/DL (ref 3.2–4.5)
ALP SERPL-CCNC: 697 U/L (ref 40–136)
ALP SERPL-CCNC: 720 U/L (ref 40–136)
ALT SERPL-CCNC: 8 U/L (ref 0–55)
ALT SERPL-CCNC: 8 U/L (ref 0–55)
APPEARANCE FLD: (no result)
BASE EXCESS STD BLDA CALC-SCNC: -3.8 MMOL/L (ref -2.5–2.5)
BASOPHILS # BLD AUTO: 0 10^3/UL (ref 0–0.1)
BASOPHILS # BLD AUTO: 0.1 10^3/UL (ref 0–0.1)
BASOPHILS NFR BLD AUTO: 0 % (ref 0–10)
BASOPHILS NFR BLD AUTO: 0 % (ref 0–10)
BDY SITE: (no result)
BILIRUB SERPL-MCNC: 2.5 MG/DL (ref 0.1–1)
BILIRUB SERPL-MCNC: 2.6 MG/DL (ref 0.1–1)
BODY FLUID SOURCE: (no result)
BODY TEMPERATURE: 36.4
BUN/CREAT SERPL: 25
BUN/CREAT SERPL: 26
CALCIUM SERPL-MCNC: 8.1 MG/DL (ref 8.5–10.1)
CALCIUM SERPL-MCNC: 8.2 MG/DL (ref 8.5–10.1)
CHLORIDE SERPL-SCNC: 106 MMOL/L (ref 98–107)
CHLORIDE SERPL-SCNC: 107 MMOL/L (ref 98–107)
CO2 BLDA CALC-SCNC: 21.3 MMOL/L (ref 21–31)
CO2 SERPL-SCNC: 17 MMOL/L (ref 21–32)
CO2 SERPL-SCNC: 17 MMOL/L (ref 21–32)
COLOR FLD: YELLOW
CREAT SERPL-MCNC: 1.5 MG/DL (ref 0.6–1.3)
CREAT SERPL-MCNC: 1.56 MG/DL (ref 0.6–1.3)
EOSINOPHIL # BLD AUTO: 0 10^3/UL (ref 0–0.3)
EOSINOPHIL # BLD AUTO: 0 10^3/UL (ref 0–0.3)
EOSINOPHIL NFR BLD AUTO: 0 % (ref 0–10)
EOSINOPHIL NFR BLD AUTO: 0 % (ref 0–10)
GFR SERPLBLD BASED ON 1.73 SQ M-ARVRAT: 34 ML/MIN
GFR SERPLBLD BASED ON 1.73 SQ M-ARVRAT: 35 ML/MIN
GLUCOSE FLD-MCNC: 89 MG/DL
GLUCOSE SERPL-MCNC: 93 MG/DL (ref 70–105)
GLUCOSE SERPL-MCNC: 94 MG/DL (ref 70–105)
HCT VFR BLD CALC: 27 % (ref 35–52)
HCT VFR BLD CALC: 28 % (ref 35–52)
HGB BLD-MCNC: 8.2 G/DL (ref 11.5–16)
HGB BLD-MCNC: 8.4 G/DL (ref 11.5–16)
INHALED O2 FLOW RATE: (no result) L/MIN
INR PPP: 1.3 (ref 0.8–1.4)
LDH FLD-CCNC: 243 U/L
LYMPHOCYTES # BLD AUTO: 0.6 10^3/UL (ref 1–4)
LYMPHOCYTES # BLD AUTO: 0.7 10^3/UL (ref 1–4)
LYMPHOCYTES NFR BLD AUTO: 3 % (ref 12–44)
LYMPHOCYTES NFR BLD AUTO: 3 % (ref 12–44)
LYMPHOCYTES NFR FLD MANUAL: 62 %
MAGNESIUM SERPL-MCNC: 1.8 MG/DL (ref 1.6–2.4)
MAGNESIUM SERPL-MCNC: 1.9 MG/DL (ref 1.6–2.4)
MANUAL DIFFERENTIAL PERFORMED BLD QL: NO
MANUAL DIFFERENTIAL PERFORMED BLD QL: NO
MCH RBC QN AUTO: 23 PG (ref 25–34)
MCH RBC QN AUTO: 24 PG (ref 25–34)
MCHC RBC AUTO-ENTMCNC: 30 G/DL (ref 32–36)
MCHC RBC AUTO-ENTMCNC: 30 G/DL (ref 32–36)
MCV RBC AUTO: 77 FL (ref 80–99)
MCV RBC AUTO: 79 FL (ref 80–99)
MONOCYTES # BLD AUTO: 0.6 10^3/UL (ref 0–1)
MONOCYTES # BLD AUTO: 0.6 10^3/UL (ref 0–1)
MONOCYTES NFR BLD AUTO: 2 % (ref 0–12)
MONOCYTES NFR BLD AUTO: 3 % (ref 0–12)
NEUTROPHILS # BLD AUTO: 21.1 10^3/UL (ref 1.8–7.8)
NEUTROPHILS # BLD AUTO: 21.6 10^3/UL (ref 1.8–7.8)
NEUTROPHILS NFR BLD AUTO: 93 % (ref 42–75)
NEUTROPHILS NFR BLD AUTO: 93 % (ref 42–75)
OTHER CELLS FLD MANUAL: 5 %
PCO2 BLDA: 33 MMHG (ref 35–45)
PH BLDA: 7.4 [PH] (ref 7.37–7.43)
PH FLD: 7.5 [PH]
PHOSPHATE SERPL-MCNC: 4.3 MG/DL (ref 2.3–4.7)
PHOSPHATE SERPL-MCNC: 4.3 MG/DL (ref 2.3–4.7)
PLATELET # BLD: 632 10^3/UL (ref 130–400)
PLATELET # BLD: 665 10^3/UL (ref 130–400)
PMV BLD AUTO: 8.8 FL (ref 9–12.2)
PMV BLD AUTO: 9 FL (ref 9–12.2)
PO2 BLDA: 52 MMHG (ref 79–93)
POTASSIUM SERPL-SCNC: 5.1 MMOL/L (ref 3.6–5)
POTASSIUM SERPL-SCNC: 5.4 MMOL/L (ref 3.6–5)
PROT FLD-MCNC: 1.9 G/DL
PROT SERPL-MCNC: 5.5 GM/DL (ref 6.4–8.2)
PROT SERPL-MCNC: 5.6 GM/DL (ref 6.4–8.2)
PROTHROMBIN TIME: 16.9 SEC (ref 12.2–14.7)
RBC # FLD: 106 /UL
SAO2 % BLDA FROM PO2: 88 % (ref 94–100)
SODIUM SERPL-SCNC: 138 MMOL/L (ref 135–145)
SODIUM SERPL-SCNC: 140 MMOL/L (ref 135–145)
VENTILATION MODE VENT: NO
WBC # BLD AUTO: 22.7 10^3/UL (ref 4.3–11)
WBC # BLD AUTO: 23.1 10^3/UL (ref 4.3–11)
WBC # FLD: 10 /UL

## 2022-05-01 PROCEDURE — 0W9G30Z DRAINAGE OF PERITONEAL CAVITY WITH DRAINAGE DEVICE, PERCUTANEOUS APPROACH: ICD-10-PCS | Performed by: SURGERY

## 2022-05-01 RX ADMIN — FENTANYL CITRATE PRN MCG: 50 INJECTION, SOLUTION INTRAMUSCULAR; INTRAVENOUS at 21:32

## 2022-05-01 RX ADMIN — SODIUM CHLORIDE SCH MLS/HR: 900 INJECTION INTRAVENOUS at 14:37

## 2022-05-01 RX ADMIN — DOCUSATE SODIUM AND SENNOSIDES SCH EA: 8.6; 5 TABLET, FILM COATED ORAL at 20:58

## 2022-05-01 RX ADMIN — IPRATROPIUM BROMIDE AND ALBUTEROL SULFATE SCH ML: .5; 3 SOLUTION RESPIRATORY (INHALATION) at 19:07

## 2022-05-01 RX ADMIN — FENTANYL CITRATE PRN MCG: 50 INJECTION, SOLUTION INTRAMUSCULAR; INTRAVENOUS at 04:34

## 2022-05-01 RX ADMIN — FENTANYL CITRATE PRN MCG: 50 INJECTION, SOLUTION INTRAMUSCULAR; INTRAVENOUS at 07:33

## 2022-05-01 RX ADMIN — ENOXAPARIN SODIUM SCH MG: 30 INJECTION SUBCUTANEOUS at 14:37

## 2022-05-01 RX ADMIN — SODIUM CHLORIDE SCH MLS/HR: 900 INJECTION INTRAVENOUS at 20:57

## 2022-05-01 RX ADMIN — SODIUM CHLORIDE SCH MLS/HR: 900 INJECTION INTRAVENOUS at 06:34

## 2022-05-01 RX ADMIN — IPRATROPIUM BROMIDE AND ALBUTEROL SULFATE SCH ML: .5; 3 SOLUTION RESPIRATORY (INHALATION) at 14:22

## 2022-05-01 RX ADMIN — IPRATROPIUM BROMIDE AND ALBUTEROL SULFATE SCH ML: .5; 3 SOLUTION RESPIRATORY (INHALATION) at 22:33

## 2022-05-01 RX ADMIN — IPRATROPIUM BROMIDE AND ALBUTEROL SULFATE SCH ML: .5; 3 SOLUTION RESPIRATORY (INHALATION) at 10:58

## 2022-05-01 RX ADMIN — FENTANYL CITRATE PRN MCG: 50 INJECTION, SOLUTION INTRAMUSCULAR; INTRAVENOUS at 18:46

## 2022-05-01 NOTE — PROGRESS NOTE
Subjective


Date Seen by a Provider:  May 1, 2022


Time Seen by a Provider:  09:00


Subjective/Events-last exam


doing ok. worsening abd distention. no fever/chills. no nausea/vomiting. had 2 

bm's yesterday.





Focused Exam


Lactate Level


4/30/22 21:05: Lactic Acid Level 0.95


5/1/22 02:25: Lactic Acid Level 1.13





Objective


Exam





Vital Signs








  Date Time  Temp Pulse Resp B/P (MAP) Pulse Ox O2 Delivery O2 Flow Rate FiO2


 


5/1/22 09:00  108 27 93/75 93 Nasal Cannula 5.00 


 


5/1/22 08:00  92 20 109/78 92 Nasal Cannula 5.00 


 


5/1/22 07:57 36.7       


 


5/1/22 07:00  96 23 123/77 95 Nasal Cannula 5.00 


 


5/1/22 07:00  94      


 


5/1/22 06:10      Nasal Cannula 5.00 


 


5/1/22 06:00  93 18 105/74 93 Nasal Cannula 5.00 


 


5/1/22 05:00  89 18 117/78 94 Nasal Cannula 5.00 


 


5/1/22 04:00 36.4       


 


5/1/22 04:00  96 18 115/84 95 Nasal Cannula 5.00 


 


5/1/22 04:00     95 Nasal Cannula 6.00 


 


5/1/22 03:00  86 15 83/57 98 Nasal Cannula 5.00 


 


5/1/22 02:00  91 17 112/76 99 Nasal Cannula 5.00 


 


5/1/22 01:00  91      


 


5/1/22 01:00  89 19 144/71 96 Nasal Cannula 5.00 


 


5/1/22 00:00  86 16 115/68 96 Nasal Cannula 5.00 


 


5/1/22 00:00 36.5       


 


5/1/22 00:00     95 Nasal Cannula 4.00 


 


4/30/22 23:00  84 15 93/52 96 Nasal Cannula 5.00 


 


4/30/22 22:15  93 15 87/54 96 Nasal Cannula 5.00 


 


4/30/22 21:45  87 19 126/65 93 Nasal Cannula 5.00 


 


4/30/22 20:15  86 14 124/69 95 Nasal Cannula 5.00 


 


4/30/22 20:00  89 19 126/69 97 Nasal Cannula 5.00 


 


4/30/22 19:45  90 16 91/54 92 Nasal Cannula 5.00 


 


4/30/22 19:38  93      


 


4/30/22 19:30  100 22 96/59 94 Nasal Cannula 5.00 


 


4/30/22 19:10 36.5       


 


4/30/22 19:10     95 Nasal Cannula 4.00 














I & O 


 


 5/1/22





 07:00


 


Intake Total 1450 ml


 


Output Total 135 ml


 


Balance 1315 ml





Capillary Refill :


General Appearance:  No Apparent Distress


HEENT:  PERRL/EOMI


Respiratory:  Chest Non Tender, Normal Breath Sounds


Cardiovascular:  Regular Rate, Rhythm


Gastrointestinal:  soft, distended


Extremity:  Normal Capillary Refill


Neurologic/Psychiatric:  Alert, Oriented x3


Skin:  Normal Color


Lymphatic:  No Adenopathy





Results


Lab


Laboratory Tests


4/30/22 19:25: 


Urine Color ORANGE, Urine Clarity CLEAR, Urine pH 5.0, Urine Specific Gravity 

1.015L, Urine Protein 2+H, Urine Glucose (UA) TRACEH, Urine Ketones 1+H, Urine 

Nitrite NEGATIVE, Urine Bilirubin 2+H, Urine Urobilinogen 1.0, Urine Leukocyte 

Esterase TRACEH, Urine RBC (Auto) 1+H, Urine RBC 2-5H, Urine WBC 10-25H, Urine 

Squamous Epithelial Cells 10-25H, Urine Renal Epithelial Cells NONE, Urine 

Crystals NONE, Urine Bacteria LARGEH, Urine Casts NONE, Urine Mucus NEGATIVE, 

Urine Culture Indicated YES


4/30/22 21:05: 


White Blood Count 24.5H, Red Blood Count 3.59L, Hemoglobin 8.3L, Hematocrit 27L,

Mean Corpuscular Volume 76L, Mean Corpuscular Hemoglobin 23L, Mean Corpuscular 

Hemoglobin Concent 30L, Red Cell Distribution Width 20.7H, Platelet Count 635H, 

Mean Platelet Volume 9.1, Immature Granulocyte % (Auto) 1, Neutrophils (%) 

(Auto) 94H, Lymphocytes (%) (Auto) 2L, Monocytes (%) (Auto) 2, Eosinophils (%) 

(Auto) 1, Basophils (%) (Auto) 0, Neutrophils # (Auto) 22.9H, Lymphocytes # 

(Auto) 0.6L, Monocytes # (Auto) 0.5, Eosinophils # (Auto) 0.2, Basophils # 

(Auto) 0.1, Immature Granulocyte # (Auto) 0.2H, Neutrophils % (Manual) 94, 

Lymphocytes % (Manual) 2, Monocytes % (Manual) 4, Polychromasia MARKED, Target 

Cells SLIGHT, Blood Morphology Comment NA, Sodium Level 137, Potassium Level 

5.5H, Chloride Level 104, Carbon Dioxide Level 18L, Anion Gap 15H, Blood Urea 

Nitrogen 38H, Creatinine 1.52H, Estimat Glomerular Filtration Rate 35, 

BUN/Creatinine Ratio 25, Glucose Level 103, Lactic Acid Level 0.95, Calcium 

Level 8.1L, Corrected Calcium 9.6, Magnesium Level 1.9, Total Bilirubin 2.6H, 

Aspartate Amino Transf (AST/SGOT) 19, Alanine Aminotransferase (ALT/SGPT) 10, 

Alkaline Phosphatase 746H, Total Protein 5.5L, Albumin 2.1L


5/1/22 02:25: 


White Blood Count 22.7H, Red Blood Count 3.45L, Hemoglobin 8.2L, Hematocrit 27L,

Mean Corpuscular Volume 79L, Mean Corpuscular Hemoglobin 24L, Mean Corpuscular 

Hemoglobin Concent 30L, Red Cell Distribution Width 21.2H, Platelet Count 632H, 

Mean Platelet Volume 9.0, Immature Granulocyte % (Auto) 2, Neutrophils (%) 

(Auto) 93H, Lymphocytes (%) (Auto) 3L, Monocytes (%) (Auto) 2, Eosinophils (%) 

(Auto) 0, Basophils (%) (Auto) 0, Neutrophils # (Auto) 21.1H, Lymphocytes # (A

uto) 0.7L, Monocytes # (Auto) 0.6, Eosinophils # (Auto) 0.0, Basophils # (Auto) 

0.1, Immature Granulocyte # (Auto) 0.3H, Sodium Level 140, Potassium Level 5.4H,

Chloride Level 107, Carbon Dioxide Level 17L, Anion Gap 16H, Blood Urea Nitrogen

39H, Creatinine 1.50H, Estimat Glomerular Filtration Rate 35, BUN/Creatinine 

Ratio 26, Glucose Level 93, Lactic Acid Level 1.13, Calcium Level 8.1L, 

Corrected Calcium 9.6, Magnesium Level 1.8, Total Bilirubin 2.6H, Aspartate 

Amino Transf (AST/SGOT) 13, Alanine Aminotransferase (ALT/SGPT) 8, Alkaline 

Phosphatase 720H, Total Protein 5.6L, Albumin 2.1L, Prothrombin Time 16.9H, INR 

Comment 1.3, Phosphorus Level 4.3


5/1/22 05:30: 


White Blood Count 23.1H, Red Blood Count 3.61L, Hemoglobin 8.4L, Hematocrit 28L,

Mean Corpuscular Volume 77L, Mean Corpuscular Hemoglobin 23L, Mean Corpuscular 

Hemoglobin Concent 30L, Red Cell Distribution Width 20.3H, Platelet Count 665H, 

Mean Platelet Volume 8.8L, Immature Granulocyte % (Auto) 1, Neutrophils (%) 

(Auto) 93H, Lymphocytes (%) (Auto) 3L, Monocytes (%) (Auto) 3, Eosinophils (%) 

(Auto) 0, Basophils (%) (Auto) 0, Neutrophils # (Auto) 21.6H, Lymphocytes # 

(Auto) 0.6L, Monocytes # (Auto) 0.6, Eosinophils # (Auto) 0.0, Basophils # 

(Auto) 0.0, Immature Granulocyte # (Auto) 0.3H, Sodium Level 138, Potassium 

Level 5.1H, Chloride Level 106, Carbon Dioxide Level 17L, Anion Gap 15H, Blood 

Urea Nitrogen 39H, Creatinine 1.56H, Estimat Glomerular Filtration Rate 34, 

BUN/Creatinine Ratio 25, Glucose Level 94, Calcium Level 8.2L, Corrected Calcium

9.7, Magnesium Level 1.9, Total Bilirubin 2.5H, Aspartate Amino Transf 

(AST/SGOT) 12, Alanine Aminotransferase (ALT/SGPT) 8, Alkaline Phosphatase 697H,

Total Protein 5.5L, Albumin 2.1L, Phosphorus Level 4.3, Blood Gas Puncture Site 

NOT INDICATED, Blood Gas Patient Temperature 36.4, Arterial Blood pH 7.40, 

Arterial Blood Partial Pressure CO2 33L, Arterial Blood Partial Pressure O2 52L,

Arterial Blood HCO3 20L, Arterial Blood Total CO2 21.3, Arterial Blood Oxygen 

Saturation 88L, Arterial Blood Base Excess -3.8L, [place] Test NA, Blood Gas 

Ventilator Setting NO, Blood Gas Inspired Oxygen 6L


5/1/22 09:35: 





Assessment/Plan


Assessment/Plan


Assess & Plan/Chief Complaint


ascites s/p laparoscopic cholecystectomy POD#31.


IV abx.


will proceed with paracentesis and send for c&s and labs.











AZALEA SEQUEIRA MD                 May 1, 2022 10:13

## 2022-05-01 NOTE — DIAGNOSTIC IMAGING REPORT
INDICATION: Hypoxia and shortness of breath.



Correlation made with the shoulder radiographs from February 26, 2019



FINDINGS:

Lung volumes are diminished. There is no large effusion. No

pneumothorax. Heart size appears appropriate without current

evidence of edema or failure.



There is severe arthritic changes present within the right

shoulder. There has been prior left shoulder arthroplasty.

Positioning of the components appear abnormal. The prosthesis

appears dislocated but there are no current findings of fracture.



IMPRESSION: 

1. Low lung volumes without findings of pneumonia or edema.

2. Abnormal appearance the patient's left shoulder arthroplasty.

Relationships appear abnormal suggesting a dislocation. Findings

could be further assessed with a followup chest CT when the

patient is clinically able.



Dictated by: 



  Dictated on workstation # [id number]

## 2022-05-01 NOTE — OPERATIVE REPORT
DATE OF SERVICE:  05/01/2022



ATTENDING PRIMARY CARE PHYSICIAN:

Dr. Artem Irwin.



PREOPERATIVE DIAGNOSIS:

Symptomatic ascites.



POSTOPERATIVE DIAGNOSIS:

Symptomatic ascites.



PROCEDURE:

Paracentesis.



SURGEON:

Azalea Sequeira MD



ANESTHESIA:

Local.



ESTIMATED BLOOD LOSS:

Minimal.



FINDINGS:

Red, yellow, turbid fluid likely indicating an infected seroma v. liquified 
hematoma.



DISPOSITION:

The patient tolerated the procedure well.



INDICATIONS:

The patient is a 77-year-old female who had symptomatic pain in the right upper

abdominal quadrant as well as nausea and vomiting, was found to have a wedged

gallstone in the cystic duct and on 03/31/2022, underwent a laparoscopic

cholecystectomy, was found to have hydrops of the gallbladder.  She did well

after surgery and was sent home later that day.  She was seen at [facility] for 1-day history of abdominal pain and distention.  She did have

a significantly elevated white count of 32.9 and anemic with a hemoglobin of

7.6.  She was also severely profoundly dehydrated with elevated BUN and

creatinine of 43 and 1.66 respectively.  A CT scan was performed, which did show

a significant amount of ascites within the peritoneal cavity.



DESCRIPTION OF PROCEDURE:

The abdomen was prepped and draped in standard surgical fashion.  A 1% lidocaine

was then used to anesthetize the skin, subcutaneous tissue, muscle layers as

well as the peritoneal lining.  A vertical skin incision was made using 11

blade.  The trocar and catheter were then introduced withdrawing of red-yellow

turbid fluid.  The catheter was then advanced over the trocar without any

resistance and connected to tubing and a gravity drainage bag.  Catheter was

then cleaned and covered with gauze followed by Op-Site.



The patient tolerated the procedure well.  We will send the fluid off for

culture and sensitivity as well as for laboratory evaluation and continue the

drain to allow for decompression of her abdomen as well as continue drainage if

there is an infectious etiology.





Job ID: [id number]

DocumentID: [id number]

Dictated Date:  05/01/2022 10:25:21

Transcription Date: 05/01/2022 19:00:08

Dictated By: AZALEA SEQUEIRA MD

[facility]

## 2022-05-01 NOTE — DIAGNOSTIC IMAGING REPORT
Indication: Central line placement.



Comparison: None.



Discussion: Single supine view of the abdomen and pelvis was

obtained. There is a right femoral central line noted with tip

projected over the right sacral ala. Postoperative changes are

noted within the lumbar spine and sacrum. Nonobstructive bowel

gas pattern.



Impression:

1. Right femoral line projected in good position.



Dictated by: 



  Dictated on workstation # [id number]

## 2022-05-01 NOTE — TELE-ICU PROGRESS NOTE
Subjective


Date Seen by a Provider:  May 1, 2022


Time Seen by a Provider:  09:00


Subjective/Events-last exam


This virtual visit was conducted using real time audio.


Thank you for asking us to see this patient for respiratory insufficiency due to

AE COPD. No formal COPD dx but heavy smoking history and has hyperinflated chest

on CXR and in person.


Recent events:Requiring 6 LPM O2. Paracentesis catheter 22 AM placed w a

pprox 2500 ml output.





PMH: Choly 3/31/2022, S/P breast CA, probable COPD.





SH: smoking history: former.





FH: Non-contributory





ROS:  as in HPI.





PE: VSS.  O2 sat 95% on 6 LPM NC.





HEENT: No obvious masses, adenopathy or JVD.


              Chest: diminished with coarse crackles on auscultation.


              CV: RRR S1 S2 No murmur or added sounds.


              Abd: Distended, tender. Bowel sounds Y, high pitched.


              : Unremarkable. Staley Y.


              CNS/psychiatric: Grossly intact. No obvious focal findings.


              Extremities:  No edema. Capillary refill < 3 seconds.


              Skin: unremarkable.





Results: Elevated WCC 23.1, BUN 39, Creat 1.56.   Decreased Hb 8.4, Alb 2.1.  

B.4/33/52 on 6 LPM..    Previous admission CXR: hyperinflated .


Available chart/ vitals / labs / images reviewed.


No video assessment done w malfunctioning teleICU camera, rest of exam as per 

RN.





A/P:  Respiratory insufficiency: Continue present management with O2.


          Monitor for increasing oxygenation needs and/or need for intubation. 

Ordered CXR. Added Duonebs, Medrol 125 mg IV 1 dose.


          Critical Care: critically ill patient. Cont. abx, PRN lortab. 





Discussed with JUAN FRANCISCO Suazo. Asked RN to reach out to eICU if any questions or 

concerns later. 


Time spent with patient/coordination of care with other health professionals 

(mins): 30





Sepsis Event


Evaluation


Height, Weight, BMI


Height: 5'"


Weight: 185lbs. oz. 83.657750du; 24.25 BMI


Method:





Focused Exam


Lactate Level


22 21:05: Lactic Acid Level 0.95


22 02:25: Lactic Acid Level 1.13





Exam


Exam


Patient acknowledged, consented, and participated in this virtual visit which 

was conducted using real time audio/video


Vital Signs








  Date Time  Temp Pulse Resp B/P (MAP) Pulse Ox O2 Delivery O2 Flow Rate FiO2


 


22 10:00  90 24 106/62 96 Nasal Cannula 5.00 


 


22 09:00  108 27 93/75 93 Nasal Cannula 5.00 


 


22 08:00  92 20 109/78 92 Nasal Cannula 5.00 


 


22 07:57 36.7       


 


22 07:00  96 23 123/77 95 Nasal Cannula 5.00 


 


22 07:00  94      


 


22 06:10      Nasal Cannula 5.00 


 


22 06:00  93 18 105/74 93 Nasal Cannula 5.00 


 


22 05:00  89 18 117/78 94 Nasal Cannula 5.00 


 


22 04:00 36.4       


 


22 04:00  96 18 115/84 95 Nasal Cannula 5.00 


 


22 04:00     95 Nasal Cannula 6.00 


 


22 03:00  86 15 83/57 98 Nasal Cannula 5.00 


 


22 02:00  91 17 112/76 99 Nasal Cannula 5.00 


 


22 01:00  91      


 


22 01:00  89 19 144/71 96 Nasal Cannula 5.00 


 


22 00:00  86 16 115/68 96 Nasal Cannula 5.00 


 


22 00:00 36.5       


 


22 00:00     95 Nasal Cannula 4.00 


 


22 23:00  84 15 93/52 96 Nasal Cannula 5.00 


 


22 22:15  93 15 87/54 96 Nasal Cannula 5.00 


 


22 21:45  87 19 126/65 93 Nasal Cannula 5.00 


 


22 20:15  86 14 124/69 95 Nasal Cannula 5.00 


 


22 20:00  89 19 126/69 97 Nasal Cannula 5.00 


 


22 19:45  90 16 91/54 92 Nasal Cannula 5.00 


 


22 19:38  93      


 


22 19:30  100 22 96/59 94 Nasal Cannula 5.00 


 


22 19:10 36.5       


 


22 19:10     95 Nasal Cannula 4.00 














I & O 


 


 22





 07:00


 


Intake Total 1450 ml


 


Output Total 135 ml


 


Balance 1315 ml








Height & Weight


Height: 5'"


Weight: 185lbs. oz. 83.547536mg; 24.25 BMI


Method:


General Appearance:  No Apparent Distress


HEENT:  PERRL/EOMI


Respiratory:  Chest Non Tender, Normal Breath Sounds


Cardiovascular:  Regular Rate, Rhythm


Gastrointestinal:  soft, distended


Extremity:  Normal Capillary Refill


Neurologic/Psychiatric:  Alert, Oriented x3


Skin:  Normal Color


Lymphatic:  No Adenopathy





Results


Lab


Laboratory Tests


22 21:05








22 02:25








22 05:30











Assessment/Plan


Assessment/Plan


See free text


Critical Care:  Critically Ill Patient











KELLY MCLAUGHLIN MD           May 1, 2022 10:42

## 2022-05-01 NOTE — CONSULTATION - HOSPITALIST
HPI


History of Present Illness:


HPI/Chief Complaint


Chief complaint: Abdominal pain suspected SBO





History present illness: This is a 77-year-old white female who transferred him 

from [facility] who Dr. SEQUEIRA has seen before for a prior 

cholecystectomy on 3/31/2022 who presented to the ICU with abdominal pain 

suspected SBO.  CT scan was not done indicated but it was done here which showed

ascites and no evidence of bowel obstruction.  Patient appears very frail and 

pale end stage and appears older than stated age of 77.  Paracentesis was 

performed and catheter still in place draining.  Patient appears to have 

neoplastic process likely hepatoma but further work-up will confirm.  

Paracentesis fluid sent for culture and cytology.  Daughter at the bedside 

reports she was doing great and in nearly "perfect health" until the abrupt 

onset of these issues but she appears to be very declined and frail which does 

not match up with the report from the daughter.  Apparently she has lost a lot 

of weight the last couple of months thinking it was her gallbladder but it 

appears she has cirrhosis and that would explain the significant decline in her 

status.


Source:  patient, family, RN/MD, old records


Exam Limitations:  clinical condition


Date Seen


5/1/22


Attending Physician


Jean Claude Sequeira MD


PCP


SelfArtem MD


Referring Physician





Date of Admission


Apr 30, 2022 at 19:13





Home Medications & Allergies


Home Medications


Reviewed patient Home Medication Reconciliation performed by pharmacy medication

reconciliations technician and/or nursing.


Patients Allergies have been reviewed.





Allergies





Allergies


Coded Allergies


  No Known Drug Allergies (Unverified3/31/22)








Past Medical-Social-Family Hx


Patient Social History


Marrital Status:  single


Employed/Student:  unemployed


Tobacco Use?:  No


Smoking Status:  Former Smoker


Smokeless Tobacco Frequency:  Never a User


Use of E-Cig and/or Vaping dev:  No


Substance use?:  No


Alcohol Use?:  Yes


Alcohol type:  Hard Liquor


Alcohol Frequency:  Once in a while


Pt feels they are or have been:  No





Immunizations Up To Date


Date of Influenza Vaccine:  Oct 1, 2021


Tetanus Booster (TDap):  More Than 5 Years


Date of Pneumonia Vaccine:  Oct 5, 2011





Seasonal Allergies


Seasonal Allergies:  Yes (OTC MEDICATION CONTROLLED)





Current Status


Advance Directives:  Yes


Advance Directive Location:  Family to bring in copy


Communicates:  Verbally


Primary Language:  English


Preferred Spoken Language:  English


Is interpretation needed?:  No


Sensory deficits:  Vision impairment


Implanted or Applied Medical D:  Orthopedic hardware





Past Medical History


Surgeries:  Orthopedic


Gall Bladder Disease


Arthritis, Back Injury


Breast


What Type of Treatment Did You:  Radiation, Surgical Intervention


Psoriasis


Blood Disorders:  No





Review of Systems


Constitutional:  see HPI, dizziness, malaise, weakness


EENTM:  no symptoms reported


Respiratory:  no symptoms reported


Cardiovascular:  no symptoms reported


Gastrointestinal:  abdominal pain, nausea


Musculoskeletal:  back pain, joint pain


Skin:  no symptoms reported


Psychiatric/Neurological:  Anxiety, Depressed


All Other Systems Reviewed


Negative Unless Noted:  Yes





Physical Exam


Physical Exam


Vital Signs





Vital Signs - First Documented








 4/30/22 4/30/22





 19:10 19:30


 


Temp 36.5 


 


Pulse  100


 


Resp  22


 


B/P (MAP)  96/59


 


Pulse Ox 95 


 


O2 Delivery Nasal Cannula 


 


O2 Flow Rate 4.00 





Capillary Refill :


Height, Weight, BMI


Height: 5'"


Weight: 185lbs. oz. 83.108259st; 24.25 BMI


Method:


General Appearance:  No Apparent Distress, WD/WN, Anxious, Chronically ill, Cac

hetic, Thin, Other (Pale)


Eyes:  Bilateral Eye Normal Inspection, Bilateral Eye PERRL


HEENT:  PERRL/EOMI, Normal ENT Inspection, Pharynx Normal


Neck:  Full Range of Motion, Normal Inspection, Non Tender, Supple, Carotid 

Bruit


Respiratory:  Chest Non Tender, Lungs Clear, Normal Breath Sounds, No Accessory 

Muscle Use, No Respiratory Distress


Cardiovascular:  Regular Rate, Rhythm, No Edema, No Gallop, No JVD, No Murmur, 

Normal Peripheral Pulses


Gastrointestinal:  Normal Bowel Sounds, No Organomegaly, No Pulsatile Mass, 

Soft, Distended, Tenderness


Back:  Normal Inspection, No CVA Tenderness, No Vertebral Tenderness


Extremity:  Normal Capillary Refill, Normal Inspection, Normal Range of Motion, 

Non Tender, No Calf Tenderness, No Pedal Edema


Neurologic/Psychiatric:  Alert, Oriented x3, No Motor/Sensory Deficits, Normal 

Mood/Affect


Skin:  Normal Color, Warm/Dry


Lymphatic:  No Adenopathy





Results


Results/Procedures


Labs


Laboratory Tests


4/30/22 21:05








5/1/22 02:25








5/1/22 05:30








Patient resulted labs reviewed.





Assessment/Plan


Assessment and Plan


Assess & Plan/Chief Complaint


Assessment:


Abdominal pain


Presumed spontaneous bacterial peritonitis


Ascites status post paracentesis removing 2500 cc


Recent cholecystectomy on 3/31/2022 for hydrops


Recent weight loss


Former smoker


History of breast cancer


Cachexia





Plan:


Await cytology and culture


Antibiotics for presumed spontaneous bacterial peritonitis


Supportive care


DNR





Diagnosis/Problems


Diagnosis/Problems





(1) Spontaneous bacterial peritonitis


(2) Cirrhosis


(3) Sepsis


(4) Hypotension











VIJAYA LATHAM DO                 May 1, 2022 06:27

## 2022-05-02 LAB
ALBUMIN SERPL-MCNC: 2.3 GM/DL (ref 3.2–4.5)
ALP SERPL-CCNC: 592 U/L (ref 40–136)
ALT SERPL-CCNC: 8 U/L (ref 0–55)
BASOPHILS # BLD AUTO: 0 10^3/UL (ref 0–0.1)
BASOPHILS NFR BLD AUTO: 0 % (ref 0–10)
BILIRUB SERPL-MCNC: 2 MG/DL (ref 0.1–1)
BUN/CREAT SERPL: 26
CALCIUM SERPL-MCNC: 8.5 MG/DL (ref 8.5–10.1)
CHLORIDE SERPL-SCNC: 106 MMOL/L (ref 98–107)
CO2 SERPL-SCNC: 19 MMOL/L (ref 21–32)
CREAT SERPL-MCNC: 1.42 MG/DL (ref 0.6–1.3)
EOSINOPHIL # BLD AUTO: 0 10^3/UL (ref 0–0.3)
EOSINOPHIL NFR BLD AUTO: 0 % (ref 0–10)
GFR SERPLBLD BASED ON 1.73 SQ M-ARVRAT: 38 ML/MIN
GLUCOSE SERPL-MCNC: 110 MG/DL (ref 70–105)
HCT VFR BLD CALC: 26 % (ref 35–52)
HGB BLD-MCNC: 7.8 G/DL (ref 11.5–16)
LYMPHOCYTES # BLD AUTO: 0.6 X 10^3 (ref 1–4)
LYMPHOCYTES NFR BLD AUTO: 3 % (ref 12–44)
MAGNESIUM SERPL-MCNC: 2.1 MG/DL (ref 1.6–2.4)
MANUAL DIFFERENTIAL PERFORMED BLD QL: NO
MCH RBC QN AUTO: 23 PG (ref 25–34)
MCHC RBC AUTO-ENTMCNC: 30 G/DL (ref 32–36)
MCV RBC AUTO: 78 FL (ref 80–99)
MONOCYTES # BLD AUTO: 0.6 X 10^3 (ref 0–1)
MONOCYTES NFR BLD AUTO: 2 % (ref 0–12)
NEUTROPHILS # BLD AUTO: 22.3 X 10^3 (ref 1.8–7.8)
NEUTROPHILS NFR BLD AUTO: 94 % (ref 42–75)
PHOSPHATE SERPL-MCNC: 3.4 MG/DL (ref 2.3–4.7)
PLATELET # BLD: 747 10^3/UL (ref 130–400)
PMV BLD AUTO: 8.7 FL (ref 9–12.2)
POTASSIUM SERPL-SCNC: 4.2 MMOL/L (ref 3.6–5)
PROT SERPL-MCNC: 5.7 GM/DL (ref 6.4–8.2)
SODIUM SERPL-SCNC: 139 MMOL/L (ref 135–145)
WBC # BLD AUTO: 23.7 10^3/UL (ref 4.3–11)

## 2022-05-02 RX ADMIN — SODIUM CHLORIDE SCH MLS/HR: 900 INJECTION, SOLUTION INTRAVENOUS at 17:22

## 2022-05-02 RX ADMIN — IPRATROPIUM BROMIDE AND ALBUTEROL SULFATE SCH ML: .5; 3 SOLUTION RESPIRATORY (INHALATION) at 15:07

## 2022-05-02 RX ADMIN — ONDANSETRON PRN MG: 2 INJECTION, SOLUTION INTRAMUSCULAR; INTRAVENOUS at 21:32

## 2022-05-02 RX ADMIN — IPRATROPIUM BROMIDE AND ALBUTEROL SULFATE SCH ML: .5; 3 SOLUTION RESPIRATORY (INHALATION) at 07:26

## 2022-05-02 RX ADMIN — ENOXAPARIN SODIUM SCH MG: 30 INJECTION SUBCUTANEOUS at 11:08

## 2022-05-02 RX ADMIN — ONDANSETRON PRN MG: 4 TABLET, ORALLY DISINTEGRATING ORAL at 17:21

## 2022-05-02 RX ADMIN — IPRATROPIUM BROMIDE AND ALBUTEROL SULFATE SCH ML: .5; 3 SOLUTION RESPIRATORY (INHALATION) at 21:46

## 2022-05-02 RX ADMIN — SODIUM CHLORIDE SCH MLS/HR: 900 INJECTION INTRAVENOUS at 06:14

## 2022-05-02 RX ADMIN — FENTANYL CITRATE PRN MCG: 50 INJECTION, SOLUTION INTRAMUSCULAR; INTRAVENOUS at 02:08

## 2022-05-02 RX ADMIN — DOCUSATE SODIUM AND SENNOSIDES SCH EA: 8.6; 5 TABLET, FILM COATED ORAL at 21:18

## 2022-05-02 RX ADMIN — IPRATROPIUM BROMIDE AND ALBUTEROL SULFATE SCH ML: .5; 3 SOLUTION RESPIRATORY (INHALATION) at 10:26

## 2022-05-02 RX ADMIN — FENTANYL CITRATE PRN MCG: 50 INJECTION, SOLUTION INTRAMUSCULAR; INTRAVENOUS at 21:26

## 2022-05-02 RX ADMIN — HYDROCODONE BITARTRATE AND ACETAMINOPHEN PRN EA: 5; 325 TABLET ORAL at 19:50

## 2022-05-02 RX ADMIN — DOCUSATE SODIUM AND SENNOSIDES SCH EA: 8.6; 5 TABLET, FILM COATED ORAL at 09:16

## 2022-05-02 RX ADMIN — IPRATROPIUM BROMIDE AND ALBUTEROL SULFATE SCH ML: .5; 3 SOLUTION RESPIRATORY (INHALATION) at 19:05

## 2022-05-02 RX ADMIN — FENTANYL CITRATE PRN MCG: 50 INJECTION, SOLUTION INTRAMUSCULAR; INTRAVENOUS at 04:37

## 2022-05-02 RX ADMIN — SODIUM CHLORIDE SCH MLS/HR: 900 INJECTION INTRAVENOUS at 23:02

## 2022-05-02 RX ADMIN — FENTANYL CITRATE PRN MCG: 50 INJECTION, SOLUTION INTRAMUSCULAR; INTRAVENOUS at 06:58

## 2022-05-02 RX ADMIN — IPRATROPIUM BROMIDE AND ALBUTEROL SULFATE SCH ML: .5; 3 SOLUTION RESPIRATORY (INHALATION) at 02:48

## 2022-05-02 RX ADMIN — HYDROCODONE BITARTRATE AND ACETAMINOPHEN PRN EA: 5; 325 TABLET ORAL at 11:08

## 2022-05-02 RX ADMIN — ALPRAZOLAM PRN MG: 0.25 TABLET ORAL at 14:51

## 2022-05-02 RX ADMIN — SODIUM CHLORIDE SCH MLS/HR: 900 INJECTION INTRAVENOUS at 14:45

## 2022-05-02 NOTE — PHYSICIAN QUERY CLARIFICATION
Physician Query-General


Query to Physician:


The medical record reflects the following clinical evidence:





Clinical Indicators:  RR 22 on admission then 16 to 27, 02 sats 92-97% on 5L on 

day of admission (P/F = 163-240), AMS with GCS of 13-14


Risk Factor(s): HF, Abdominal ascites, Extensive smoking Hx, No documentation of

home 02 use prior to admission, likely SBP/abdominal abscess


Treatment: Supplemental 02 up to 7L, Paracentesis with several liters removed 

off abdomen, Duo nebs, IV ABX, 





1. Acute respiratory failure with hypoxia, present on admission


2. Other explanation of clinical findings


3. Unable to determine (no explanation for clinical findings)





Please clarify and document your clinical opinion in the progress notes and 

discharge summary including the definitive and/or presumptive diagnosis, 

(suspected or probable), related to the above clinical findings. Please include 

clinical findings supporting your diagnosis.


Rupinder Mir MSN, RN


Clinical 


[phone]


[email or web address]





PHYSICIAN RESPONSE:


Based on the clinical findings in the record, please respond to the query above 

on this document as an addendum. 








Physician Response:


Physician Response


1.














If you have questions please contact:


                   


:


Ext:





Thank you for your time and cooperation.


Clinical /








*********************This is a permanent part of the medical 

record*******************











RUPINDER MIR                    May 2, 2022 21:53


AZALEA SEQUEIRA MD                 May 3, 2022 17:01

## 2022-05-02 NOTE — OCCUPATIONAL THERAPY EVAL
OT Evaluation-General/PLF


Medical Diagnosis


Admission Date


Apr 30, 2022 at 19:13


Medical Diagnosis:  possible bowel obstruction


Onset Date:  Apr 30, 2022





Therapy Diagnosis


Therapy Diagnosis:  decreased ADL Status





Height/Weight


Height (Feet):  5


Weight (Pounds):  185





Precautions


Precautions/Isolations:  Fall Prevention, Standard Precautions, Pressure Ulcer





Referral


Physician:  Je Segura Reason:  Evaluation/Treatment





Medical History


Additional Medical History


HTN, hypercholesterolemia, degenerative joint disease, anemia, L breast cancer 

x2, L shoulder surgery, L spinal fusion


Current History


transfer to [facility] for surgical consult due to abdominal pain and distension. 

5/1/22 s/p paracentesis





Social History


Home:  Multilevel


Current Living Status:  Children (daughter)


Entry Into Home:  Stairs With Railing


 Steps Into Home:  4





ADL-Prior Level of Function


SCALE: Activities may be completed with or without assistive devices.





6-Indepedent-patient completes the activity by him/herself with no assistance 

from a helper.


5-Set-up or Clean-up Assistance-helper sets up or cleans up; patient completes 

activity. Cecil assists only prior to or  


    following the activity.


4-Supervision or Touching Assistance-helper provides verbal cues and/or 

touching/steadying and/or contact guard assistance as patient completes 

activity. Assistance may be provided   


    throughout the activity or intermittently.


3-Partial/Moderate Assistance-helper does LESS THAN HALF the effort. Cecil 

lifts, holds or supports trunk or limbs, but provides less than half the effort.


2-Substantial/Maximal Assistance-helper does MORE THAN HALF the effort. Cecil 

lifts or holds trunk or limbs and provides more than half the effort.


6-Ybocjmmys-lqrslh does ALL the effort. Patient does none of the effort to 

complete the activity. Or, the assistance of 2 or more helpers is required for 

the patient to complete the  


    activity.


If activity was not attempted, code reason:


7-Patient Refused.


9-Not Applicable-not attempted and the patient did not perform the activity 

before the current illness, exacerbation or injury.


10-Not Attempted due to Environmental Limitations-(lack of equipment, weather 

restraints, etc.).


88-Not Attempted due to Medical Conditions or Safety Concerns.


ADL PLOF Comments


Pt reports living with her daughter on the 2nd floor (main floor of ranch style 

home). She has 3-4 steps into the house, with rail but had difficulty with 

completing at baseline.She has a w/c and walker, required assistance pushing w/c

around and assistance with using walker. She is able to don shirt herself, but 

requires assistance with sponge baths, LB dressing, footwear, and toileting. In 

order to get into bathroom, pt's daughter would push pt into bathroom in w/c, 

then assist pt with transferring to toilet. She only completes sponge baths due 

to main level bathroom having bathtub without shower.


Self Care:  Needed Some Help


Functional Cognition:  Independent


DME/Equipment:  Tub





OT Current Status


Subjective


Pt in bed, granddaughter present. Pt expresses need for caregiver assistance at 

home, as her daughter has been helping her but it is a burden on her daughter. 

OT notified pt's nurse of request for  to visit pt.





Mental Status/Objective


Patient Orientation:  Person, Place, Situation





Current


Upper Extremity ROM


LUE decreased, slight shoulder flexion (increased pain), WFL elbow/wrist/hand. 

PROM attempted but pt reports increased pain in shoulder.


RUE WFL


Upper Extremity Strength


LUE grossly 2/5


RUE grossly 3/5





ADL-Treatment


Eating (QC):  5 (per pt report. Assist to cut food and open containers)


Oral Hygiene (QC):  5 (per pt/nursing report.)





Other Treatments


Pt in bed, family member present. Pt provided information about PLOF and home 

set up and participated in UE screen. Pt states she needs to have caregiver 

assistance at home, but unsure what her insurance will cover. OT informed pt's 

nurse of pt's concerns and request for  visit. OT attempted PROM L 

shoulder, but pt c/o pain with minimal movement, so PROM terminated. Pt has no 

c/o pain with elbow, wrist, finger/hand movements on L side. Pt declined OOB 

activities at this time, requesting to rest. Per PT report, pt required max A 

supine to/from sit transfer, bed to/from chair transfer and with ambulation 

using FWW. OT educated pt on OT POC while she is admitted to hospital, including

increasing safety and independence with ADLs and functional mobility, as well as

increasing BUE strength and activity tolerance, she verbalizes understanding. 

Post tx, pt in bed, call light in reach and all needs met.





Education


OT Patient Education:  Correct positioning, Energy conservation, Modified ADL 

techniques, Progress toward Goal/Update tx plan, Purpose of tx/functional 

activities, Rehab process


Teaching Recipient:  Patient


Teaching Methods:  Discussion


Response to Teaching:  Verbalize Understanding





OT Long Term Goals


Long Term Goals


Time Frame:  May 13, 2022


Eating (QC):  5


Oral Hygiene (QC):  5


Toileting Hygiene (QC):  3


Shower/Bathe Self (QC):  3


Upper Body Dressing (QC):  5


Lower Body Dressing (QC):  2


Additional Goals:  1-Demonstrate ADL Tasks, 2-Verbalize Understanding, 3-

ImproveStrength/Carly


1=Demonstrate adherence to instructed precautions during ADL tasks.


2=Patient will verbalize/demonstrate understanding of assistive 

devices/modifications for ADL.


3=Patient will improve strength/tolerance for activity to enable patient to 

perform ADL's.





OT Education/Plan


Problem List/Assessment


Assessment:  Decreased Activ Tolerance, Decreased UE Strength, Impaired 

Coordination, Impaired Funct Balance, Impaired I ADL's, Impaired Self-Care 

Skills, Restricted Funct UE ROM





Discharge Recommendations


Plan/Recommendations:  Continue POC


Therapy Discharge Recommendati:  Other, See Comments


Comment


Pt would benefit from caregiver assistance within her house vs SNF





Treatment Plan/Plan of Care


Patient would benefit from OT for education, treatment and training to promote 

independence in ADL's, mobility, safety and/or upper extremity function for 

ADL's.


Plan of Care:  ADL Retraining, Functional Mobility, UE Funct Exercise/Act


Treatment Duration:  May 13, 2022


Frequency:  3 times per week (3-5 times per week)


Rehab Potential:  Guarded





Time/GCodes


Start Time:  11:25


Stop Time:  11:40


Total Time Billed (hr/min):  15


Billed Treatment Time


1ROBERT ADDISON OT           May 2, 2022 12:05

## 2022-05-02 NOTE — PROGRESS NOTE - HOSPITALIST
Subjective


HPI/CC On Admission


Date Seen by Provider:  May 2, 2022


Time Seen by Provider:  10:30


Chief complaint: Abdominal pain suspected SBO





History present illness: This is a 77-year-old white female who transferred him 

from [facility] who Dr. SEQUEIRA has seen before for a prior 

cholecystectomy on 3/31/2022 who presented to the ICU with abdominal pain 

suspected SBO.  CT scan was not done indicated but it was done here which showed

ascites and no evidence of bowel obstruction.  Patient appears very frail and 

pale end stage and appears older than stated age of 77.  Paracentesis was 

performed and catheter still in place draining.  Patient appears to have 

neoplastic process likely hepatoma but further work-up will confirm.  

Paracentesis fluid sent for culture and cytology.  Daughter at the bedside 

reports she was doing great and in nearly "perfect health" until the abrupt 

onset of these issues but she appears to be very declined and frail which does 

not match up with the report from the daughter.  Apparently she has lost a lot 

of weight the last couple of months thinking it was her gallbladder but it 

appears she has cirrhosis and that would explain the significant decline in her 

status.


Subjective/Events-last exam


Patient doing about the same


Pain is improved


Paracentesis fluid noted to be no growth to date


Broad-spectrum antibiotics maintained


Supportive care to continue


PT and OT will be ordered





Review of Systems


General:  Fatigue, Malaise





Focused Exam


Lactate Level


4/30/22 21:05: Lactic Acid Level 0.95


5/1/22 02:25: Lactic Acid Level 1.13








Objective


Exam


Vital Signs





Vital Signs








  Date Time  Temp Pulse Resp B/P (MAP) Pulse Ox O2 Delivery O2 Flow Rate FiO2


 


5/2/22 10:27     100 Nasal Cannula 5.00 


 


5/2/22 07:49 36.7 79 18 95/71    





Capillary Refill :


General Appearance:  No Apparent Distress, WD/WN, Chronically ill, Cachetic, 

Thin


Respiratory:  Lungs Clear


Cardiovascular:  Regular Rate, Rhythm


Neurologic/Psychiatric:  Alert, Oriented x3





Results/Procedures


Lab


Patient resulted labs reviewed.





Assessment/Plan


Assessment and Plan


Assess & Plan/Chief Complaint


Assessment:


Abdominal pain


Presumed spontaneous bacterial peritonitis


Ascites status post paracentesis removing 2500 cc


Recent cholecystectomy on 3/31/2022 for hydrops


Recent weight loss


Former smoker


History of breast cancer


Cachexia





Plan:


Await cytology and culture


Antibiotics for presumed spontaneous bacterial peritonitis


Supportive care


DNR





5/2/2022:


Supportive care


Grave prognosis





Critical Care


Critically Ill Patient





Diagnosis/Problems


Diagnosis/Problems





(1) Spontaneous bacterial peritonitis


(2) Cirrhosis


(3) Sepsis


(4) Hypotension











VIJAYA LATHAM DO                 May 2, 2022 06:17

## 2022-05-02 NOTE — PHYSICAL THERAPY EVALUATION
PT Evaluation-General


Medical Diagnosis


Admission Date


Apr 30, 2022 at 19:13


Medical Diagnosis:  possible bowel obstruction


Onset Date:  Apr 30, 2022





Therapy Diagnosis


Therapy Diagnosis:  generalized weakness/debility





Height/Weight


Height (Feet):  5


Weight (Pounds):  185





Precautions


Precautions/Isolations:  Fall Prevention, Standard Precautions, Pressure Ulcer





Referral


Physician:  Je


Reason for Referral:  Evaluation/Treatment





Medical History


Pertinent Medical History:  HTN


Additional Medical History


breast cancer


Current History


transfer from [facility] secondary to abdominal distention


Reviewed History:  Yes





Social History


Home:  [facility]


Current Living Status:  Children (daughter)


Entry Into Home:  Stairs With Railing


PT Steps Into Home:  4





Prior


Prior Level of Function


SCALE: Activities may be completed with or without assistive devices.





6-Indepedent-patient completes the activity by him/herself with no assistance 

from a helper.


5-Set-up or Clean-up Assistance-helper sets up or cleans up; patient completes 

activity. Drake assists only prior to or  


    following the activity.


4-Supervision or Touching Assistance-helper provides verbal cues and/or 

touching/steadying and/or contact guard assistance as patient completes 

activity. Assistance may be provided   


    throughout the activity or intermittently.


3-Partial/Moderate Assistance-helper does LESS THAN HALF the effort. Drake 

lifts, holds or supports trunk or limbs, but provides less than half the effort.


2-Substantial/Maximal Assistance-helper does MORE THAN HALF the effort. Drake 

lifts or holds trunk or limbs and provides more than half the effort.


2-Gmysypwpc-xwnvmc does ALL the effort. Patient does none of the effort to 

complete the activity. Or, the assistance of 2 or more helpers is required for 

the patient to complete the  


    activity.


If activity was not attempted, code reason:


7-Patient Refused.


9-Not Applicable-not attempted and the patient did not perform the activity 

before the current illness, exacerbation or injury.


10-Not Attempted due to Environmental Limitations-(lack of equipment, weather 

restraints, etc.).


88-Not Attempted due to Medical Conditions or Safety Concerns.


Bed Mobility:  3


Transfers (B,C,W/C):  3


Gait:  3


Indoor Mobility (Ambulation):  Needed Some Help


Prior Devices Use:  Walker


per patient, she hasn't "done much lately"





PT Evaluation-Current


Subjective


Patient agrees to PT.





Objective


Patient Orientation:  Normal For Age


Attachments:  Oxygen, Drains, Staley Catheter





ROM/Strength


ROM Lower Extremities


bilateral LE WFL


Strength Lower Extremities


3-/5 grossly bilateral LE





Integumentary/Posture


Integumentary


refer to nursing notes


Bladder Incontinence:  Staley Cath


Posture


kyphotic





Neuromuscular


(Tone, Coordination, Reflexes)


grossly intact





Sensory


Vision:  Functional


Hearing:  Functional





Transfers


Lying to Sitting/Side of Bed(Q:  2


Sit to Stand (QC):  2


Chair/Bed-to-Chair Xfer(QC):  2





Gait


Does the Patient Walk?:  Yes


Mode of Locomotion:  Both


Anticipated Mode of Locomotion:  Both


Walk 10 feet (QC):  2


Walk 50 ft with 2 Turns(QC):  88


Walk 150 ft (QC):  88


Gait Assistive Device:  FWW


Comments/Gait Description


shuffle gait sequence





Balance


Sitting Static:  Fair


Sitting Dynamic:  Fair


Standing Static:  Poor


 Standing Dynamic:  Poor





Assessment/Needs


77 y.o. female, will benefit from skilled PT to address functional strength and 

mobility to improve current LOF to safely return to home with family at maximum 

LOF.


Rehab Potential:  Guarded





PT Long Term Goals


Long Term Goals


PT Long Term Goals Time Frame:  May 14, 2022


Roll Left & Right (QC):  3


Sit to Lying (QC):  3


Lying-Sitting on Side/Bed(QC):  3


Sit to Stand (QC):  3


Chair/Bed-to-Chair Xfer(QC):  3


Toilet Transfer (QC):  3


Walk 10 feet (QC):  3


Walk 50ft with 2 Turns (QC):  3





PT Plan


Problem List


Problem List:  Activity Tolerance, Functional Strength, Safety, Balance, Gait, 

Transfer, Bed Mobility





Treatment/Plan


Treatment Plan:  Continue Plan of Care


Treatment Plan:  Bed Mobility, Education, Functional Activity Carly, Functional 

Strength, Gait, Safety, Therapeutic Exercise, Transfers


Treatment Duration:  May 14, 2022


Frequency:  6 times per week


Estimated Hrs Per Day:  .25 hour per day





Time/GCodes


Time In:  1325


Time Out:  1336


Total Billed Treatment Time:  11


Total Billed Treatment


1 visit


EVMod 11 min











FRANCIE FRAZIER PT               May 2, 2022 13:51

## 2022-05-02 NOTE — PHYSICIAN QUERY CLARIFICATION
Physician Query-General


Query to Physician:


The medical record reflects the following clinical evidence:





Clinical Indicators: Admission HGB 8,3, Documentation on admission by nursing of

Stage 2 pressure ulcer to the sacral area, 2 cmx1 cm, 


Risk Factor(s): ongoing N/V with decrease nutritional intake, Weakness, Anemia, 


Treatment: Turn and reposition q 2 hours, frequent skin assessments, Allevyn in 

place 





1. Pressure ulcer of sacral region, stage 2, present on admission


2. Other explanation of clinical findings


3. Unable to determine (no explanation for clinical findings)








Please clarify and document your clinical opinion in the progress notes and 

discharge summary including the definitive and/or presumptive diagnosis, (cherry

spected or probable), related to the above clinical findings. Please include 

clinical findings supporting your diagnosis.





Rupinder Mir  MSN, RN


Clinical  


[phone]


[email or web address]





PHYSICIAN RESPONSE:


Based on the clinical findings in the record, please respond to the query above 

on this document as an addendum. 








Physician Response:


patient was septic upon admission due to tachycardia, hypotension and severe 

leukocytosis.


Physician Response


1.














If you have questions please contact:


                   


:


Ext:





Thank you for your time and cooperation.


Clinical /








*********************This is a permanent part of the medical 

record*******************











RUPINDER MIR                    May 2, 2022 21:37


AZALEA SEQUEIRA MD                 May 3, 2022 12:44

## 2022-05-02 NOTE — PHYSICIAN QUERY CLARIFICATION
Physician Query-General


Query to Physician:


The medical record reflects the following clinical scenario:





The patient, in the setting of History/Risk factors, Presumed SBP/intra 

abdominal infection


Clinical Findings Admission VS/Labs: ,  RR 22, BP 96/59, SpO2 94% sat on 5

L T 36.5, WBC 24.5,Lactic acid 0.95


Treatment LR 1L , Zosyn IV, Vanco IV, 1L NS





Question:  Do you agree with the impression of Sepsis, present on admission per 

Dr. NETO Wooten? 





1. Yes; will document Sepsis, present on admission in the Progress Notes


2. No; will continue current documentation in the Progress Notes 


3. Other; will document explanation of clinical findings


4. Clinically undetermined; no explanation for clinical findings








Please clarify and document your clinical opinion in the Progress Notes and 

Discharge Summary including the definitive and/or presumptive diagnosis, 

(suspected or probable), related to the above clinical findings. Please include 

clinical findings supporting your diagnosis.


In responding to this query, please exercise your independent professional 

judgment.  The purpose of this communication is to more accurately reflect the 

complexity of your patients condition. The fact that a question is asked does 

not imply that any particular answer is desired or expected.  





Please remember a lack of response to the above will prompt a phone page by 

CDI/coding staff





Thank you for timely response to this clarification.   


      


Rupinder Walls MSN, RN


Clinical 


[phone]


[email or web address]





PHYSICIAN RESPONSE:


Based on the clinical findings in the record, please respond to the query above 

on this document as an addendum. 








Physician Response:


Physician Response


patient was septic due to tachycardia, hypotension and leukocytosis.














If you have questions please contact:


                   


:


Ext:





Thank you for your time and cooperation.


Clinical /








*********************This is a permanent part of the medical 

record*******************











RUPINDER WALLS                    May 2, 2022 21:36


AZALEA SEQUEIRA MD                 May 3, 2022 12:43

## 2022-05-02 NOTE — PROGRESS NOTE
Subjective


Date Seen by a Provider:  May 2, 2022


Time Seen by a Provider:  17:10


Subjective/Events-last exam


Patient seen with Dr. Martinez.  Patient reports doing ok, but having some nausea 

but no vomiting.  Does reports some left side abdominal pain and RUQ pain.  

Denies any fever/chills.  Reports tolerating diet and had small BM this AM.





Focused Exam


Lactate Level


4/30/22 21:05: Lactic Acid Level 0.95


5/1/22 02:25: Lactic Acid Level 1.13





Objective


Exam





Vital Signs








  Date Time  Temp Pulse Resp B/P (MAP) Pulse Ox O2 Delivery O2 Flow Rate FiO2


 


5/2/22 15:58 36.5 82 18 89/58 100 Nasal Cannula 3.00 


 


5/2/22 15:07     96 Nasal Cannula 4.00 


 


5/2/22 11:57 37.2 88 18 90/59 98 Nasal Cannula 5.00 


 


5/2/22 10:27     100 Nasal Cannula 5.00 


 


5/2/22 09:26     95 Nasal Cannula 6.00 


 


5/2/22 07:49 36.7 79 18 95/71 99 Nasal Cannula 5.00 


 


5/2/22 07:26     97 Nasal Cannula 5.00 


 


5/2/22 04:00 36.8 88 18 101/68 98 Nasal Cannula 5.00 


 


5/2/22 02:48     98 Nasal Cannula 5.00 


 


5/2/22 02:08  82  106/68    


 


5/2/22 00:18 36.3 82 18 88/54 98 Nasal Cannula 6.00 


 


5/1/22 22:35     98 Nasal Cannula 6.00 


 


5/1/22 21:50     95 Nasal Cannula 6.00 


 


5/1/22 19:11     96 Nasal Cannula 6.00 


 


5/1/22 19:05 37.6 96 22 96/58 95 Nasal Cannula 6.00 














I & O 


 


 5/2/22





 07:00


 


Intake Total 1300 ml


 


Output Total 3850 ml


 


Balance -2550 ml





Capillary Refill :


General Appearance:  No Apparent Distress, WD/WN


Neck:  Normal Inspection, Supple


Respiratory:  No Accessory Muscle Use, No Respiratory Distress


Gastrointestinal:  normal bowel sounds, soft, tenderness (LLQ, RUQ)


Extremity:  Normal Range of Motion, Non Tender





Results


Lab


Laboratory Tests


5/2/22 00:06: Glucometer 163H


5/2/22 05:38: Glucometer 119H


5/2/22 14:31: 


White Blood Count 23.7H, Red Blood Count 3.37L, Hemoglobin 7.8L, Hematocrit 26L,

Mean Corpuscular Volume 78L, Mean Corpuscular Hemoglobin 23L, Mean Corpuscular 

Hemoglobin Concent 30L, Red Cell Distribution Width 21.4H, Platelet Count 747H, 

Mean Platelet Volume 8.7L, Immature Granulocyte % (Auto) 1, Neutrophils (%) 

(Auto) 94H, Lymphocytes (%) (Auto) 3L, Monocytes (%) (Auto) 2, Eosinophils (%) 

(Auto) 0, Basophils (%) (Auto) 0, Neutrophils # (Auto) 22.3H, Lymphocytes # 

(Auto) 0.6L, Monocytes # (Auto) 0.6, Eosinophils # (Auto) 0.0, Basophils # 

(Auto) 0.0, Immature Granulocyte # (Auto) 0.2H, Sodium Level 139, Potassium 

Level 4.2, Chloride Level 106, Carbon Dioxide Level 19L, Anion Gap 14, Blood 

Urea Nitrogen 37H, Creatinine 1.42H, Estimat Glomerular Filtration Rate 38, 

BUN/Creatinine Ratio 26, Glucose Level 110H, Calcium Level 8.5, Corrected 

Calcium 9.9, Phosphorus Level 3.4, Magnesium Level 2.1, Total Bilirubin 2.0H, 

Aspartate Amino Transf (AST/SGOT) 8, Alanine Aminotransferase (ALT/SGPT) 8, 

Alkaline Phosphatase 592H, Total Protein 5.7L, Albumin 2.3L





Microbiology


5/1/22 Blood Culture - Preliminary, Resulted


         No growth


5/1/22 Gram Stain - Final, Resulted


         


5/1/22 Body Fluid Culture - Preliminary, Resulted


         No growth


4/30/22 Urine Culture - Final, Complete


          NO GROWTH


4/30/22 MRSA Screen - Final, Complete


          MRSA not isolated





Assessment/Plan


Assessment/Plan


Assess & Plan/Chief Complaint


A 77 year old female with ascites s/p laparoscopic cholecystectomy POD#32.


VSS


WBC 23.7 - No growth on peritoneal fluid or blood cultures


IV abx.


Pain and nausea meds PRN











ISI HERRING APRN           May 2, 2022 18:41

## 2022-05-03 VITALS — SYSTOLIC BLOOD PRESSURE: 108 MMHG | DIASTOLIC BLOOD PRESSURE: 58 MMHG

## 2022-05-03 VITALS — DIASTOLIC BLOOD PRESSURE: 64 MMHG | SYSTOLIC BLOOD PRESSURE: 90 MMHG

## 2022-05-03 VITALS — SYSTOLIC BLOOD PRESSURE: 89 MMHG | DIASTOLIC BLOOD PRESSURE: 57 MMHG

## 2022-05-03 VITALS — DIASTOLIC BLOOD PRESSURE: 63 MMHG | SYSTOLIC BLOOD PRESSURE: 98 MMHG

## 2022-05-03 VITALS — DIASTOLIC BLOOD PRESSURE: 61 MMHG | SYSTOLIC BLOOD PRESSURE: 90 MMHG

## 2022-05-03 VITALS — SYSTOLIC BLOOD PRESSURE: 69 MMHG | DIASTOLIC BLOOD PRESSURE: 37 MMHG

## 2022-05-03 LAB
ALBUMIN SERPL-MCNC: 1.9 GM/DL (ref 3.2–4.5)
ALP SERPL-CCNC: 466 U/L (ref 40–136)
ALT SERPL-CCNC: 8 U/L (ref 0–55)
BASOPHILS # BLD AUTO: 0 10^3/UL (ref 0–0.1)
BASOPHILS NFR BLD AUTO: 0 % (ref 0–10)
BILIRUB SERPL-MCNC: 1.7 MG/DL (ref 0.1–1)
BUN/CREAT SERPL: 27
CALCIUM SERPL-MCNC: 7.9 MG/DL (ref 8.5–10.1)
CHLORIDE SERPL-SCNC: 108 MMOL/L (ref 98–107)
CO2 SERPL-SCNC: 18 MMOL/L (ref 21–32)
CREAT SERPL-MCNC: 1.12 MG/DL (ref 0.6–1.3)
EOSINOPHIL # BLD AUTO: 0.2 10^3/UL (ref 0–0.3)
EOSINOPHIL NFR BLD AUTO: 1 % (ref 0–10)
GFR SERPLBLD BASED ON 1.73 SQ M-ARVRAT: 50 ML/MIN
GLUCOSE SERPL-MCNC: 105 MG/DL (ref 70–105)
HCT VFR BLD CALC: 23 % (ref 35–52)
HGB BLD-MCNC: 7 G/DL (ref 11.5–16)
LYMPHOCYTES # BLD AUTO: 0.6 10^3/UL (ref 1–4)
LYMPHOCYTES NFR BLD AUTO: 3 % (ref 12–44)
MAGNESIUM SERPL-MCNC: 1.9 MG/DL (ref 1.6–2.4)
MANUAL DIFFERENTIAL PERFORMED BLD QL: NO
MCH RBC QN AUTO: 25 PG (ref 25–34)
MCHC RBC AUTO-ENTMCNC: 30 G/DL (ref 32–36)
MCV RBC AUTO: 81 FL (ref 80–99)
MONOCYTES # BLD AUTO: 0.5 10^3/UL (ref 0–1)
MONOCYTES NFR BLD AUTO: 3 % (ref 0–12)
NEUTROPHILS # BLD AUTO: 17.6 10^3/UL (ref 1.8–7.8)
NEUTROPHILS NFR BLD AUTO: 91 % (ref 42–75)
PHOSPHATE SERPL-MCNC: 3.2 MG/DL (ref 2.3–4.7)
PLATELET # BLD: 597 10^3/UL (ref 130–400)
PMV BLD AUTO: 8.9 FL (ref 9–12.2)
POTASSIUM SERPL-SCNC: 3.5 MMOL/L (ref 3.6–5)
PROT SERPL-MCNC: 4.8 GM/DL (ref 6.4–8.2)
SODIUM SERPL-SCNC: 138 MMOL/L (ref 135–145)
WBC # BLD AUTO: 19.3 10^3/UL (ref 4.3–11)

## 2022-05-03 RX ADMIN — IPRATROPIUM BROMIDE AND ALBUTEROL SULFATE SCH ML: .5; 3 SOLUTION RESPIRATORY (INHALATION) at 14:21

## 2022-05-03 RX ADMIN — IPRATROPIUM BROMIDE AND ALBUTEROL SULFATE SCH ML: .5; 3 SOLUTION RESPIRATORY (INHALATION) at 23:26

## 2022-05-03 RX ADMIN — IPRATROPIUM BROMIDE AND ALBUTEROL SULFATE SCH ML: .5; 3 SOLUTION RESPIRATORY (INHALATION) at 10:50

## 2022-05-03 RX ADMIN — ALPRAZOLAM PRN MG: 0.25 TABLET ORAL at 11:20

## 2022-05-03 RX ADMIN — IPRATROPIUM BROMIDE AND ALBUTEROL SULFATE SCH ML: .5; 3 SOLUTION RESPIRATORY (INHALATION) at 02:52

## 2022-05-03 RX ADMIN — SODIUM CHLORIDE SCH MLS/HR: 900 INJECTION INTRAVENOUS at 06:19

## 2022-05-03 RX ADMIN — SODIUM CHLORIDE SCH MLS/HR: 900 INJECTION INTRAVENOUS at 13:26

## 2022-05-03 RX ADMIN — IPRATROPIUM BROMIDE AND ALBUTEROL SULFATE SCH ML: .5; 3 SOLUTION RESPIRATORY (INHALATION) at 07:11

## 2022-05-03 RX ADMIN — ONDANSETRON PRN MG: 2 INJECTION, SOLUTION INTRAMUSCULAR; INTRAVENOUS at 02:24

## 2022-05-03 RX ADMIN — FENTANYL CITRATE PRN MCG: 50 INJECTION, SOLUTION INTRAMUSCULAR; INTRAVENOUS at 13:27

## 2022-05-03 RX ADMIN — SODIUM CHLORIDE SCH MLS/HR: 900 INJECTION, SOLUTION INTRAVENOUS at 21:37

## 2022-05-03 RX ADMIN — FENTANYL CITRATE PRN MCG: 50 INJECTION, SOLUTION INTRAMUSCULAR; INTRAVENOUS at 06:19

## 2022-05-03 RX ADMIN — SODIUM CHLORIDE SCH MLS/HR: 900 INJECTION, SOLUTION INTRAVENOUS at 04:51

## 2022-05-03 RX ADMIN — FENTANYL CITRATE PRN MCG: 50 INJECTION, SOLUTION INTRAMUSCULAR; INTRAVENOUS at 09:31

## 2022-05-03 RX ADMIN — SODIUM CHLORIDE SCH MLS/HR: 900 INJECTION INTRAVENOUS at 21:37

## 2022-05-03 RX ADMIN — FENTANYL CITRATE PRN MCG: 50 INJECTION, SOLUTION INTRAMUSCULAR; INTRAVENOUS at 18:45

## 2022-05-03 RX ADMIN — FENTANYL CITRATE PRN MCG: 50 INJECTION, SOLUTION INTRAMUSCULAR; INTRAVENOUS at 15:17

## 2022-05-03 RX ADMIN — ENOXAPARIN SODIUM SCH MG: 30 INJECTION SUBCUTANEOUS at 11:20

## 2022-05-03 RX ADMIN — HYDROCODONE BITARTRATE AND ACETAMINOPHEN PRN EA: 5; 325 TABLET ORAL at 00:59

## 2022-05-03 RX ADMIN — FENTANYL CITRATE PRN MCG: 50 INJECTION, SOLUTION INTRAMUSCULAR; INTRAVENOUS at 23:40

## 2022-05-03 RX ADMIN — ONDANSETRON PRN MG: 4 TABLET, ORALLY DISINTEGRATING ORAL at 11:20

## 2022-05-03 RX ADMIN — ONDANSETRON PRN MG: 2 INJECTION, SOLUTION INTRAMUSCULAR; INTRAVENOUS at 21:37

## 2022-05-03 RX ADMIN — IPRATROPIUM BROMIDE AND ALBUTEROL SULFATE SCH ML: .5; 3 SOLUTION RESPIRATORY (INHALATION) at 20:08

## 2022-05-03 RX ADMIN — DOCUSATE SODIUM AND SENNOSIDES SCH EA: 8.6; 5 TABLET, FILM COATED ORAL at 09:30

## 2022-05-03 RX ADMIN — SODIUM CHLORIDE SCH MLS/HR: 900 INJECTION, SOLUTION INTRAVENOUS at 11:20

## 2022-05-03 RX ADMIN — DOCUSATE SODIUM AND SENNOSIDES SCH EA: 8.6; 5 TABLET, FILM COATED ORAL at 20:11

## 2022-05-03 NOTE — PROGRESS NOTE - HOSPITALIST
Subjective


HPI/CC On Admission


Date Seen by Provider:  May 3, 2022


Time Seen by Provider:  10:00


Chief complaint: Abdominal pain suspected SBO





History present illness: This is a 77-year-old white female who transferred him 

from [facility] who Dr. SEQUEIRA has seen before for a prior 

cholecystectomy on 3/31/2022 who presented to the ICU with abdominal pain 

suspected SBO.  CT scan was not done indicated but it was done here which showed

ascites and no evidence of bowel obstruction.  Patient appears very frail and 

pale end stage and appears older than stated age of 77.  Paracentesis was 

performed and catheter still in place draining.  Patient appears to have 

neoplastic process likely hepatoma but further work-up will confirm.  

Paracentesis fluid sent for culture and cytology.  Daughter at the bedside 

reports she was doing great and in nearly "perfect health" until the abrupt 

onset of these issues but she appears to be very declined and frail which does 

not match up with the report from the daughter.  Apparently she has lost a lot 

of weight the last couple of months thinking it was her gallbladder but it 

appears she has cirrhosis and that would explain the significant decline in her 

status.


Subjective/Events-last exam


Pt is about the same


White count down to 19,000


Vancomycin was discontinued due to MRSA negative swab


Hospice was recommended and daughter will pursue that


Updated 





Review of Systems


General:  Fatigue, Malaise


Gastrointestinal:  Abdominal Pain





Focused Exam


Lactate Level








Objective


Exam


Vital Signs





Vital Signs








  Date Time  Temp Pulse Resp B/P (MAP) Pulse Ox O2 Delivery O2 Flow Rate FiO2


 


5/3/22 23:55 36.0 85 14 108/58 (75) 96 Nasal Cannula 2.00 





Capillary Refill :


General Appearance:  No Apparent Distress, WD/WN, Chronically ill, Cachetic, 

Thin


Respiratory:  Lungs Clear, Normal Breath Sounds


Cardiovascular:  Regular Rate, Rhythm


Neurologic/Psychiatric:  Alert, Oriented x3





Results/Procedures


Lab


Laboratory Tests


5/3/22 05:36








Patient resulted labs reviewed.





Assessment/Plan


Assessment and Plan


Assess & Plan/Chief Complaint


Assessment:


Abdominal pain


Presumed spontaneous bacterial peritonitis


Ascites status post paracentesis removing 2500 cc


Recent cholecystectomy on 3/31/2022 for hydrops


Recent weight loss


Former smoker


History of breast cancer


Cachexia





Plan:


Await cytology and culture


Antibiotics for presumed spontaneous bacterial peritonitis


Supportive care


DNR





5/2/2022:


Supportive care


Grave prognosis





5/3/22:


Hospice





Critical Care


Critically Ill Patient





Diagnosis/Problems


Diagnosis/Problems





(1) Spontaneous bacterial peritonitis


(2) Cirrhosis


(3) Sepsis


(4) Hypotension











VIJAYA LATHAM DO                 May 3, 2022 05:22

## 2022-05-03 NOTE — PHYSICAL THERAPY PROGRESS NOTE
Therapy Progress Note


Patient declined PT stating,"I'm done.  I'm going on hospice."  Family present. 

PT will attempt tomorrow one more time then dismiss patient from services.


1 ref











FRANCIE FRAZIER PT               May 3, 2022 11:15

## 2022-05-03 NOTE — OCC THERAPY PROGRESS NOTE
Therapy Progress Note


Pt declined OT services on this date due to not feeling well. Family members 

present. OT educated pt on purpose/benefit of OT, but she pleasantly declined at

this time. OT will attempt tx again tomorrow.





1, refusal


[id number]











JEREMIAS AGUAYO OT           May 3, 2022 12:05

## 2022-05-03 NOTE — PROGRESS NOTE
Subjective


Date Seen by a Provider:  May 3, 2022


Time Seen by a Provider:  13:00


Subjective/Events-last exam


patient made hospice care today. otherwise doing ok. no SOB. tolerating diet but

low appetite. no fever/chills. continues to have copious peritoneal drainage.





Focused Exam


Lactate Level


4/30/22 21:05: Lactic Acid Level 0.95


5/1/22 02:25: Lactic Acid Level 1.13





Objective


Exam





Vital Signs








  Date Time  Temp Pulse Resp B/P (MAP) Pulse Ox O2 Delivery O2 Flow Rate FiO2


 


5/3/22 16:05    90/64 (73)    


 


5/3/22 15:43 36.1 79 18 69/37 (48) 97 Nasal Cannula 3.00 


 


5/3/22 14:21     93 Nasal Cannula 2.00 


 


5/3/22 11:20 37.1 77 18 89/57 (68) 98 Nasal Cannula 3.00 


 


5/3/22 09:00      Nasal Cannula 2.00 


 


5/3/22 08:21 36.9 72 18 98/63 (75) 96 Nasal Cannula 3.00 


 


5/3/22 07:18      Nasal Cannula 2.00 


 


5/3/22 07:11     99 Nasal Cannula 3.00 


 


5/3/22 04:41 36.2 69 18 99/59 100 Nasal Cannula 3.00 


 


5/3/22 00:24 36.2 79 18 89/53 97 Nasal Cannula 3.00 


 


5/2/22 21:00      Nasal Cannula 4.00 


 


5/2/22 20:43 36.6 78 17 91/56 99 Nasal Cannula 3.00 


 


5/2/22 19:05     93 Nasal Cannula 3.00 














I & O 


 


 5/3/22





 07:00


 


Intake Total 2970 ml


 


Output Total 5125 ml


 


Balance -2155 ml





Capillary Refill :


General Appearance:  No Apparent Distress


HEENT:  PERRL/EOMI


Respiratory:  Decreased Breath Sounds, Rhonci


Cardiovascular:  Regular Rate, Rhythm


Gastrointestinal:  normal bowel sounds, soft, tenderness


Extremity:  Normal Capillary Refill


Neurologic/Psychiatric:  Alert, Oriented x3


Skin:  Normal Color


Lymphatic:  No Adenopathy





Results


Lab


Laboratory Tests


5/3/22 00:01: Glucometer 123H


5/3/22 05:35: Glucometer 106


5/3/22 05:36: 


White Blood Count 19.3H, Red Blood Count 2.84L, Hemoglobin 7.0L, Hematocrit 23L,

Mean Corpuscular Volume 81, Mean Corpuscular Hemoglobin 25, Mean Corpuscular 

Hemoglobin Concent 30L, Red Cell Distribution Width 23.3H, Platelet Count 597H, 

Mean Platelet Volume 8.9L, Immature Granulocyte % (Auto) 1, Neutrophils (%) 

(Auto) 91H, Lymphocytes (%) (Auto) 3L, Monocytes (%) (Auto) 3, Eosinophils (%) 

(Auto) 1, Basophils (%) (Auto) 0, Neutrophils # (Auto) 17.6H, Lymphocytes # 

(Auto) 0.6L, Monocytes # (Auto) 0.5, Eosinophils # (Auto) 0.2, Basophils # 

(Auto) 0.0, Immature Granulocyte # (Auto) 0.3H, Sodium Level 138, Potassium 

Level 3.5L, Chloride Level 108H, Carbon Dioxide Level 18L, Anion Gap 12, Blood 

Urea Nitrogen 30H, Creatinine 1.12, Estimat Glomerular Filtration Rate 50, 

BUN/Creatinine Ratio 27, Glucose Level 105, Calcium Level 7.9L, Corrected 

Calcium 9.6, Phosphorus Level 3.2, Magnesium Level 1.9, Total Bilirubin 1.7H, 

Aspartate Amino Transf (AST/SGOT) 8, Alanine Aminotransferase (ALT/SGPT) 8, 

Alkaline Phosphatase 466H, Total Protein 4.8L, Albumin 1.9L


5/3/22 11:05: Glucometer 107





Microbiology


5/1/22 Blood Culture - Preliminary, Resulted


         No growth


5/1/22 Gram Stain - Final, Resulted


         


5/1/22 Body Fluid Culture - Preliminary, Resulted


         No growth


4/30/22 Urine Culture - Final, Complete


          NO GROWTH


4/30/22 MRSA Screen - Final, Complete


          MRSA not isolated





Assessment/Plan


Assessment/Plan


Assess & Plan/Chief Complaint


ascites s/p laparoscopic cholecystectomy POD#34.


IV abx.


will proceed with paracentesis and send for c&s and labs.


pt made hospice.


cont abx and drainage for now.











AZALEA SEQUEIRA MD                 May 3, 2022 16:11

## 2022-05-04 VITALS — DIASTOLIC BLOOD PRESSURE: 55 MMHG | SYSTOLIC BLOOD PRESSURE: 99 MMHG

## 2022-05-04 VITALS — DIASTOLIC BLOOD PRESSURE: 68 MMHG | SYSTOLIC BLOOD PRESSURE: 111 MMHG

## 2022-05-04 VITALS — DIASTOLIC BLOOD PRESSURE: 57 MMHG | SYSTOLIC BLOOD PRESSURE: 115 MMHG

## 2022-05-04 VITALS — SYSTOLIC BLOOD PRESSURE: 93 MMHG | DIASTOLIC BLOOD PRESSURE: 59 MMHG

## 2022-05-04 VITALS — DIASTOLIC BLOOD PRESSURE: 64 MMHG | SYSTOLIC BLOOD PRESSURE: 98 MMHG

## 2022-05-04 LAB
%HYPO/RBC NFR BLD AUTO: (no result) %
ALBUMIN SERPL-MCNC: 1.7 GM/DL (ref 3.2–4.5)
ALP SERPL-CCNC: 373 U/L (ref 40–136)
ALT SERPL-CCNC: 7 U/L (ref 0–55)
ANISOCYTOSIS BLD QL SMEAR: (no result)
BASOPHILS # BLD AUTO: 0 10^3/UL (ref 0–0.1)
BASOPHILS NFR BLD AUTO: 0 % (ref 0–10)
BILIRUB SERPL-MCNC: 1.4 MG/DL (ref 0.1–1)
BUN/CREAT SERPL: 25
CALCIUM SERPL-MCNC: 7.7 MG/DL (ref 8.5–10.1)
CHLORIDE SERPL-SCNC: 109 MMOL/L (ref 98–107)
CO2 SERPL-SCNC: 19 MMOL/L (ref 21–32)
CREAT SERPL-MCNC: 0.83 MG/DL (ref 0.6–1.3)
EOSINOPHIL # BLD AUTO: 0.2 10^3/UL (ref 0–0.3)
EOSINOPHIL NFR BLD AUTO: 1 % (ref 0–10)
EOSINOPHIL NFR BLD MANUAL: 1 %
GFR SERPLBLD BASED ON 1.73 SQ M-ARVRAT: 72 ML/MIN
GLUCOSE SERPL-MCNC: 83 MG/DL (ref 70–105)
HCT VFR BLD CALC: 23 % (ref 35–52)
HGB BLD-MCNC: 6.8 G/DL (ref 11.5–16)
LYMPHOCYTES # BLD AUTO: 0.7 10^3/UL (ref 1–4)
LYMPHOCYTES NFR BLD AUTO: 5 % (ref 12–44)
MAGNESIUM SERPL-MCNC: 1.8 MG/DL (ref 1.6–2.4)
MANUAL DIFFERENTIAL PERFORMED BLD QL: YES
MCH RBC QN AUTO: 24 PG (ref 25–34)
MCHC RBC AUTO-ENTMCNC: 30 G/DL (ref 32–36)
MCV RBC AUTO: 81 FL (ref 80–99)
MONOCYTES # BLD AUTO: 0.5 10^3/UL (ref 0–1)
MONOCYTES NFR BLD AUTO: 4 % (ref 0–12)
MONOCYTES NFR BLD: 2 %
NEUTROPHILS # BLD AUTO: 11.7 10^3/UL (ref 1.8–7.8)
NEUTROPHILS NFR BLD AUTO: 87 % (ref 42–75)
NEUTS BAND NFR BLD MANUAL: 91 %
PHOSPHATE SERPL-MCNC: 2.3 MG/DL (ref 2.3–4.7)
PLATELET # BLD: 505 10^3/UL (ref 130–400)
PLATELET CLUMP BLD QL SMEAR: (no result)
PMV BLD AUTO: 8.7 FL (ref 9–12.2)
POLYCHROMASIA BLD QL SMEAR: SLIGHT
POTASSIUM SERPL-SCNC: 3 MMOL/L (ref 3.6–5)
PROT SERPL-MCNC: 4.4 GM/DL (ref 6.4–8.2)
SODIUM SERPL-SCNC: 138 MMOL/L (ref 135–145)
VARIANT LYMPHS NFR BLD MANUAL: 6 %
WBC # BLD AUTO: 13.5 10^3/UL (ref 4.3–11)

## 2022-05-04 RX ADMIN — ONDANSETRON PRN MG: 2 INJECTION, SOLUTION INTRAMUSCULAR; INTRAVENOUS at 21:34

## 2022-05-04 RX ADMIN — DOCUSATE SODIUM AND SENNOSIDES SCH EA: 8.6; 5 TABLET, FILM COATED ORAL at 21:34

## 2022-05-04 RX ADMIN — ALPRAZOLAM PRN MG: 0.25 TABLET ORAL at 09:03

## 2022-05-04 RX ADMIN — ALPRAZOLAM PRN MG: 0.25 TABLET ORAL at 01:37

## 2022-05-04 RX ADMIN — SODIUM CHLORIDE SCH MLS/HR: 900 INJECTION INTRAVENOUS at 21:34

## 2022-05-04 RX ADMIN — FENTANYL CITRATE PRN MCG: 50 INJECTION, SOLUTION INTRAMUSCULAR; INTRAVENOUS at 09:04

## 2022-05-04 RX ADMIN — ALPRAZOLAM PRN MG: 0.25 TABLET ORAL at 21:34

## 2022-05-04 RX ADMIN — IPRATROPIUM BROMIDE AND ALBUTEROL SULFATE SCH ML: .5; 3 SOLUTION RESPIRATORY (INHALATION) at 14:00

## 2022-05-04 RX ADMIN — IPRATROPIUM BROMIDE AND ALBUTEROL SULFATE SCH ML: .5; 3 SOLUTION RESPIRATORY (INHALATION) at 11:15

## 2022-05-04 RX ADMIN — DOCUSATE SODIUM AND SENNOSIDES SCH EA: 8.6; 5 TABLET, FILM COATED ORAL at 09:03

## 2022-05-04 RX ADMIN — SODIUM CHLORIDE SCH MLS/HR: 900 INJECTION, SOLUTION INTRAVENOUS at 18:28

## 2022-05-04 RX ADMIN — MELATONIN 3 MG ORAL TABLET PRN MG: 3 TABLET ORAL at 22:31

## 2022-05-04 RX ADMIN — FENTANYL CITRATE PRN MCG: 50 INJECTION, SOLUTION INTRAMUSCULAR; INTRAVENOUS at 22:31

## 2022-05-04 RX ADMIN — NYSTATIN SCH ML: 100000 SUSPENSION ORAL at 21:34

## 2022-05-04 RX ADMIN — SODIUM CHLORIDE SCH MLS/HR: 900 INJECTION INTRAVENOUS at 13:08

## 2022-05-04 RX ADMIN — FENTANYL CITRATE PRN MCG: 50 INJECTION, SOLUTION INTRAMUSCULAR; INTRAVENOUS at 18:28

## 2022-05-04 RX ADMIN — IPRATROPIUM BROMIDE AND ALBUTEROL SULFATE SCH ML: .5; 3 SOLUTION RESPIRATORY (INHALATION) at 07:17

## 2022-05-04 RX ADMIN — IPRATROPIUM BROMIDE AND ALBUTEROL SULFATE SCH ML: .5; 3 SOLUTION RESPIRATORY (INHALATION) at 19:10

## 2022-05-04 RX ADMIN — FENTANYL CITRATE PRN MCG: 50 INJECTION, SOLUTION INTRAMUSCULAR; INTRAVENOUS at 13:08

## 2022-05-04 RX ADMIN — FENTANYL CITRATE PRN MCG: 50 INJECTION, SOLUTION INTRAMUSCULAR; INTRAVENOUS at 05:25

## 2022-05-04 RX ADMIN — FENTANYL CITRATE PRN MCG: 50 INJECTION, SOLUTION INTRAMUSCULAR; INTRAVENOUS at 15:36

## 2022-05-04 RX ADMIN — ENOXAPARIN SODIUM SCH MG: 30 INJECTION SUBCUTANEOUS at 10:57

## 2022-05-04 RX ADMIN — SODIUM CHLORIDE SCH MLS/HR: 900 INJECTION INTRAVENOUS at 05:26

## 2022-05-04 RX ADMIN — IPRATROPIUM BROMIDE AND ALBUTEROL SULFATE SCH ML: .5; 3 SOLUTION RESPIRATORY (INHALATION) at 22:03

## 2022-05-04 RX ADMIN — SODIUM CHLORIDE SCH MLS/HR: 900 INJECTION, SOLUTION INTRAVENOUS at 09:04

## 2022-05-04 RX ADMIN — MELATONIN 3 MG ORAL TABLET PRN MG: 3 TABLET ORAL at 01:37

## 2022-05-04 NOTE — PHYSICAL THERAPY PROGRESS NOTE
Therapy Progress Note


Nursing reports patient is refusing all therapies today, will check back 

tomorrow.











LAINE VU PT                 May 4, 2022 10:45

## 2022-05-04 NOTE — PROGRESS NOTE
Subjective


Date Seen by a Provider:  May 4, 2022


Time Seen by a Provider:  14:00


Subjective/Events-last exam


pt status unchanged. still having significant peritoneal drain output. t

olerating some diet but cannnot eat much. no fever/chills.





Objective


Exam





Vital Signs








  Date Time  Temp Pulse Resp B/P (MAP) Pulse Ox O2 Delivery O2 Flow Rate FiO2


 


5/4/22 11:59 36.9 80 18 93/59 (70) 97 Nasal Cannula 1.00 


 


5/4/22 11:15     98 Nasal Cannula 1.00 


 


5/4/22 09:00      Nasal Cannula 2.00 


 


5/4/22 07:42 36.7 78 16 99/55 (70) 98 Nasal Cannula 1.50 


 


5/4/22 07:17     98 Nasal Cannula 1.00 


 


5/4/22 04:00  76 18 115/57 (76) 98 Nasal Cannula 2.00 


 


5/3/22 23:55 36.0 85 14 108/58 (75) 96 Nasal Cannula 2.00 


 


5/3/22 21:00      Nasal Cannula 2.00 


 


5/3/22 19:10 35.7 85 18 90/61 (71) 98 Nasal Cannula 3.00 


 


5/3/22 16:05    90/64 (73)    


 


5/3/22 15:43 36.1 79 18 69/37 (48) 97 Nasal Cannula 3.00 














I & O 


 


 5/4/22





 07:00


 


Intake Total 1910 ml


 


Output Total 4350 ml


 


Balance -2440 ml





Capillary Refill :


General Appearance:  No Apparent Distress


HEENT:  PERRL/EOMI


Neck:  Full Range of Motion


Respiratory:  Chest Non Tender, Decreased Breath Sounds


Cardiovascular:  Regular Rate, Rhythm


Gastrointestinal:  soft, tenderness


Extremity:  Normal Capillary Refill


Neurologic/Psychiatric:  Alert, Oriented x3


Skin:  Normal Color


Lymphatic:  No Adenopathy





Results


Lab


Laboratory Tests


5/3/22 18:21: Glucometer 108


5/4/22 00:15: Glucometer 100


5/4/22 05:40: 


White Blood Count 13.5H, Red Blood Count 2.83L, Hemoglobin 6.8*L, Hematocrit 23L

, Mean Corpuscular Volume 81, Mean Corpuscular Hemoglobin 24L, Mean Corpuscular 

Hemoglobin Concent 30L, Red Cell Distribution Width 23.2H, Platelet Count 505H, 

Mean Platelet Volume 8.7L, Immature Granulocyte % (Auto) 3, Neutrophils (%) 

(Auto) 87H, Lymphocytes (%) (Auto) 5L, Monocytes (%) (Auto) 4, Eosinophils (%) 

(Auto) 1, Basophils (%) (Auto) 0, Neutrophils # (Auto) 11.7H, Lymphocytes # 

(Auto) 0.7L, Monocytes # (Auto) 0.5, Eosinophils # (Auto) 0.2, Basophils # 

(Auto) 0.0, Immature Granulocyte # (Auto) 0.4H, Neutrophils % (Manual) 91, 

Lymphocytes % (Manual) 6, Monocytes % (Manual) 2, Eosinophils % (Manual) 1, 

Clumped Platelets OCCASIONAL, Polychromasia SLIGHT, Hypochromasia MODERATE, 

Anisocytosis MODERATE, Sodium Level 138, Potassium Level 3.0L, Chloride Level 

109H, Carbon Dioxide Level 19L, Anion Gap 10, Blood Urea Nitrogen 21H, Creat

inine 0.83, Estimat Glomerular Filtration Rate 72, BUN/Creatinine Ratio 25, 

Glucose Level 83, Calcium Level 7.7L, Corrected Calcium 9.5, Phosphorus Level 

2.3, Magnesium Level 1.8, Total Bilirubin 1.4H, Aspartate Amino Transf 

(AST/SGOT) 10, Alanine Aminotransferase (ALT/SGPT) 7, Alkaline Phosphatase 373H,

Total Protein 4.4L, Albumin 1.7L


5/4/22 11:56: Glucometer 100





Microbiology


5/1/22 Blood Culture - Preliminary, Resulted


         No growth


5/1/22 Gram Stain - Final, Complete


         


5/1/22 Body Fluid Culture - Final, Complete


         No growth


4/30/22 Urine Culture - Final, Complete


          NO GROWTH


4/30/22 MRSA Screen - Final, Complete


          MRSA not isolated





Assessment/Plan


Assessment/Plan


Assess & Plan/Chief Complaint


ascites s/p laparoscopic cholecystectomy POD#34.


IV abx.


will proceed with paracentesis and send for c&s and labs.


pt made hospice.


cont abx and drainage for now.











AZALEA SEQUEIRA MD                 May 4, 2022 14:49

## 2022-05-04 NOTE — PROGRESS NOTE - HOSPITALIST
Subjective


HPI/CC On Admission


Date Seen by Provider:  May 4, 2022


Time Seen by Provider:  10:00


Chief complaint: Abdominal pain suspected SBO





History present illness: This is a 77-year-old white female who transferred him 

from [facility] who Dr. SEQUEIRA has seen before for a prior 

cholecystectomy on 3/31/2022 who presented to the ICU with abdominal pain 

suspected SBO.  CT scan was not done indicated but it was done here which showed

ascites and no evidence of bowel obstruction.  Patient appears very frail and 

pale end stage and appears older than stated age of 77.  Paracentesis was 

performed and catheter still in place draining.  Patient appears to have 

neoplastic process likely hepatoma but further work-up will confirm.  

Paracentesis fluid sent for culture and cytology.  Daughter at the bedside 

reports she was doing great and in nearly "perfect health" until the abrupt 

onset of these issues but she appears to be very declined and frail which does 

not match up with the report from the daughter.  Apparently she has lost a lot 

of weight the last couple of months thinking it was her gallbladder but it 

appears she has cirrhosis and that would explain the significant decline in her 

status.


Subjective/Events-last exam


Pt is doing about the same


Hospice is consulted


Hemoglobin is 6.8 


She will be going on hospice


Potassium was 3.0 I am correcting that


Brother from [place] has come to see her also





Review of Systems


General:  Fatigue, Malaise





Objective


Exam


Vital Signs





Vital Signs








  Date Time  Temp Pulse Resp B/P (MAP) Pulse Ox O2 Delivery O2 Flow Rate FiO2


 


5/5/22 04:04 36.9 83 18 106/67 (80) 95 Nasal Cannula 1.00 





Capillary Refill :


General Appearance:  No Apparent Distress, WD/WN, Chronically ill


Respiratory:  Lungs Clear, Normal Breath Sounds


Cardiovascular:  Regular Rate, Rhythm


Neurologic/Psychiatric:  Alert, Oriented x3





Results/Procedures


Lab


Laboratory Tests


5/4/22 05:40








Patient resulted labs reviewed.





Assessment/Plan


Assessment and Plan


Assess & Plan/Chief Complaint


Assessment:


Abdominal pain


Presumed spontaneous bacterial peritonitis


Ascites status post paracentesis removing 2500 cc


Recent cholecystectomy on 3/31/2022 for hydrops


Recent weight loss


Former smoker


History of breast cancer


Cachexia





Plan:


Await cytology and culture


Antibiotics for presumed spontaneous bacterial peritonitis


Supportive care


DNR





5/2/2022:


Supportive care


Grave prognosis





5/3/22:


Hospice





5/4/22:


Await final decision from family


Poor prognosis





Critical Care


Critically Ill Patient





Diagnosis/Problems


Diagnosis/Problems





(1) Spontaneous bacterial peritonitis


(2) Cirrhosis


(3) Sepsis


(4) Hypotension











VIJAYA LATHAM DO                 May 4, 2022 05:53

## 2022-05-04 NOTE — OCC THERAPY PROGRESS NOTE
Therapy Progress Note


Per nrsg...Pt is wanting to go on hospice and will have that discussion with 

family today.  Nrsg also reported that pt is refusing all therapies, advised not

to see pt at this time.











FABRIZIO RINALDI                May 4, 2022 10:37

## 2022-05-05 VITALS — SYSTOLIC BLOOD PRESSURE: 106 MMHG | DIASTOLIC BLOOD PRESSURE: 70 MMHG

## 2022-05-05 VITALS — DIASTOLIC BLOOD PRESSURE: 72 MMHG | SYSTOLIC BLOOD PRESSURE: 112 MMHG

## 2022-05-05 VITALS — SYSTOLIC BLOOD PRESSURE: 124 MMHG | DIASTOLIC BLOOD PRESSURE: 84 MMHG

## 2022-05-05 VITALS — DIASTOLIC BLOOD PRESSURE: 68 MMHG | SYSTOLIC BLOOD PRESSURE: 111 MMHG

## 2022-05-05 VITALS — SYSTOLIC BLOOD PRESSURE: 106 MMHG | DIASTOLIC BLOOD PRESSURE: 67 MMHG

## 2022-05-05 VITALS — SYSTOLIC BLOOD PRESSURE: 109 MMHG | DIASTOLIC BLOOD PRESSURE: 68 MMHG

## 2022-05-05 VITALS — SYSTOLIC BLOOD PRESSURE: 102 MMHG | DIASTOLIC BLOOD PRESSURE: 64 MMHG

## 2022-05-05 VITALS — DIASTOLIC BLOOD PRESSURE: 60 MMHG | SYSTOLIC BLOOD PRESSURE: 97 MMHG

## 2022-05-05 LAB
ALBUMIN SERPL-MCNC: 1.7 GM/DL (ref 3.2–4.5)
ALP SERPL-CCNC: 389 U/L (ref 40–136)
ALT SERPL-CCNC: 7 U/L (ref 0–55)
BASOPHILS # BLD AUTO: 0 10^3/UL (ref 0–0.1)
BASOPHILS NFR BLD AUTO: 0 % (ref 0–10)
BILIRUB SERPL-MCNC: 1.3 MG/DL (ref 0.1–1)
BUN/CREAT SERPL: 19
CALCIUM SERPL-MCNC: 7.6 MG/DL (ref 8.5–10.1)
CHLORIDE SERPL-SCNC: 110 MMOL/L (ref 98–107)
CO2 SERPL-SCNC: 18 MMOL/L (ref 21–32)
CREAT SERPL-MCNC: 0.74 MG/DL (ref 0.6–1.3)
EOSINOPHIL # BLD AUTO: 0.2 10^3/UL (ref 0–0.3)
EOSINOPHIL NFR BLD AUTO: 1 % (ref 0–10)
GFR SERPLBLD BASED ON 1.73 SQ M-ARVRAT: 83 ML/MIN
GLUCOSE SERPL-MCNC: 78 MG/DL (ref 70–105)
HCT VFR BLD CALC: 23 % (ref 35–52)
HGB BLD-MCNC: 6.7 G/DL (ref 11.5–16)
LYMPHOCYTES # BLD AUTO: 0.7 10^3/UL (ref 1–4)
LYMPHOCYTES NFR BLD AUTO: 5 % (ref 12–44)
MAGNESIUM SERPL-MCNC: 1.8 MG/DL (ref 1.6–2.4)
MANUAL DIFFERENTIAL PERFORMED BLD QL: NO
MCH RBC QN AUTO: 25 PG (ref 25–34)
MCHC RBC AUTO-ENTMCNC: 29 G/DL (ref 32–36)
MCV RBC AUTO: 85 FL (ref 80–99)
MONOCYTES # BLD AUTO: 0.6 10^3/UL (ref 0–1)
MONOCYTES NFR BLD AUTO: 4 % (ref 0–12)
NEUTROPHILS # BLD AUTO: 11.8 10^3/UL (ref 1.8–7.8)
NEUTROPHILS NFR BLD AUTO: 87 % (ref 42–75)
PHOSPHATE SERPL-MCNC: 1.9 MG/DL (ref 2.3–4.7)
PLATELET # BLD: 549 10^3/UL (ref 130–400)
PMV BLD AUTO: 8.9 FL (ref 9–12.2)
POTASSIUM SERPL-SCNC: 3.1 MMOL/L (ref 3.6–5)
PROT SERPL-MCNC: 4.3 GM/DL (ref 6.4–8.2)
SODIUM SERPL-SCNC: 138 MMOL/L (ref 135–145)
WBC # BLD AUTO: 13.5 10^3/UL (ref 4.3–11)

## 2022-05-05 RX ADMIN — IPRATROPIUM BROMIDE AND ALBUTEROL SULFATE SCH ML: .5; 3 SOLUTION RESPIRATORY (INHALATION) at 18:49

## 2022-05-05 RX ADMIN — NYSTATIN SCH ML: 100000 SUSPENSION ORAL at 21:11

## 2022-05-05 RX ADMIN — DOCUSATE SODIUM AND SENNOSIDES SCH EA: 8.6; 5 TABLET, FILM COATED ORAL at 21:11

## 2022-05-05 RX ADMIN — ONDANSETRON PRN MG: 2 INJECTION, SOLUTION INTRAMUSCULAR; INTRAVENOUS at 14:08

## 2022-05-05 RX ADMIN — DOCUSATE SODIUM AND SENNOSIDES SCH EA: 8.6; 5 TABLET, FILM COATED ORAL at 07:54

## 2022-05-05 RX ADMIN — ONDANSETRON PRN MG: 2 INJECTION, SOLUTION INTRAMUSCULAR; INTRAVENOUS at 17:51

## 2022-05-05 RX ADMIN — POTASSIUM CHLORIDE SCH MLS/HR: 200 INJECTION, SOLUTION INTRAVENOUS at 11:11

## 2022-05-05 RX ADMIN — IPRATROPIUM BROMIDE AND ALBUTEROL SULFATE SCH ML: .5; 3 SOLUTION RESPIRATORY (INHALATION) at 22:08

## 2022-05-05 RX ADMIN — IPRATROPIUM BROMIDE AND ALBUTEROL SULFATE SCH ML: .5; 3 SOLUTION RESPIRATORY (INHALATION) at 02:14

## 2022-05-05 RX ADMIN — FENTANYL CITRATE PRN MCG: 50 INJECTION, SOLUTION INTRAMUSCULAR; INTRAVENOUS at 07:58

## 2022-05-05 RX ADMIN — FENTANYL CITRATE PRN MCG: 50 INJECTION, SOLUTION INTRAMUSCULAR; INTRAVENOUS at 10:16

## 2022-05-05 RX ADMIN — FENTANYL CITRATE PRN MCG: 50 INJECTION, SOLUTION INTRAMUSCULAR; INTRAVENOUS at 03:58

## 2022-05-05 RX ADMIN — POTASSIUM CHLORIDE SCH MLS/HR: 200 INJECTION, SOLUTION INTRAVENOUS at 08:51

## 2022-05-05 RX ADMIN — FENTANYL SCH MCG: 12 PATCH TRANSDERMAL at 11:48

## 2022-05-05 RX ADMIN — SODIUM CHLORIDE SCH MLS/HR: 900 INJECTION INTRAVENOUS at 14:08

## 2022-05-05 RX ADMIN — IPRATROPIUM BROMIDE AND ALBUTEROL SULFATE SCH ML: .5; 3 SOLUTION RESPIRATORY (INHALATION) at 14:25

## 2022-05-05 RX ADMIN — FENTANYL CITRATE PRN MCG: 50 INJECTION, SOLUTION INTRAMUSCULAR; INTRAVENOUS at 05:55

## 2022-05-05 RX ADMIN — POTASSIUM CHLORIDE SCH MEQ: 1500 TABLET, EXTENDED RELEASE ORAL at 06:02

## 2022-05-05 RX ADMIN — NYSTATIN SCH ML: 100000 SUSPENSION ORAL at 06:02

## 2022-05-05 RX ADMIN — SODIUM CHLORIDE SCH MLS/HR: 900 INJECTION, SOLUTION INTRAVENOUS at 04:41

## 2022-05-05 RX ADMIN — SODIUM CHLORIDE SCH MLS/HR: 900 INJECTION INTRAVENOUS at 05:56

## 2022-05-05 RX ADMIN — POTASSIUM CHLORIDE SCH MLS/HR: 200 INJECTION, SOLUTION INTRAVENOUS at 14:08

## 2022-05-05 RX ADMIN — POTASSIUM CHLORIDE SCH MLS/HR: 200 INJECTION, SOLUTION INTRAVENOUS at 10:02

## 2022-05-05 RX ADMIN — FENTANYL CITRATE PRN MCG: 50 INJECTION, SOLUTION INTRAMUSCULAR; INTRAVENOUS at 00:15

## 2022-05-05 RX ADMIN — IPRATROPIUM BROMIDE AND ALBUTEROL SULFATE SCH ML: .5; 3 SOLUTION RESPIRATORY (INHALATION) at 07:52

## 2022-05-05 RX ADMIN — ENOXAPARIN SODIUM SCH MG: 100 INJECTION SUBCUTANEOUS at 14:08

## 2022-05-05 RX ADMIN — IPRATROPIUM BROMIDE AND ALBUTEROL SULFATE SCH ML: .5; 3 SOLUTION RESPIRATORY (INHALATION) at 10:38

## 2022-05-05 RX ADMIN — NYSTATIN SCH ML: 100000 SUSPENSION ORAL at 14:08

## 2022-05-05 NOTE — PHYSICAL THERAPY PROGRESS NOTE
Therapy Progress Note


Patient has declined PT after several attempts.  Patient requests PT dismiss her

from our services because she "wants to be left alone".  SW notified.  PT to 

dismiss patient from services at this time.


1 visit











FRANCIE FRAZIER PT               May 5, 2022 10:30

## 2022-05-05 NOTE — PROGRESS NOTE
Subjective


Date Seen by a Provider:  May 5, 2022


Time Seen by a Provider:  14:30


Subjective/Events-last exam


Patient seen with Dr. Martinez.  Patient reports doing ok but having nausea and an 

episode of emesis, but no blood or coffee-ground emesis.  Does report some 

heartburn currently.  Having BMs.





Objective


Exam





Vital Signs








  Date Time  Temp Pulse Resp B/P (MAP) Pulse Ox O2 Delivery O2 Flow Rate FiO2


 


5/5/22 11:56 36.6 83 16 102/64 (77) 97 Nasal Cannula 1.00 


 


5/5/22 10:45     98 Nasal Cannula 0.00 


 


5/5/22 09:00      Nasal Cannula 2.00 


 


5/5/22 07:31 36.8 67 16 111/68 (82) 98 Nasal Cannula 1.00 


 


5/5/22 04:04 36.9 83 18 106/67 (80) 95 Nasal Cannula 1.00 


 


5/5/22 02:15     97 Nasal Cannula 1.00 


 


5/5/22 00:15 36.9 80 18 97/60 (72) 95 Nasal Cannula 1.00 


 


5/4/22 22:03     96 Nasal Cannula 1.00 


 


5/4/22 21:00      Nasal Cannula 2.00 


 


5/4/22 20:00 37.0 78 18 111/68 (82) 97 Nasal Cannula 1.00 


 


5/4/22 19:12     95 Nasal Cannula 1.00 


 


5/4/22 15:45 36.9 86 18 98/64 (75) 95 Nasal Cannula 1.00 














I & O 


 


 5/5/22





 06:59


 


Intake Total 970 ml


 


Output Total 725 ml


 


Balance 245 ml





Capillary Refill :


General Appearance:  No Apparent Distress, WD/WN


Respiratory:  No Accessory Muscle Use, No Respiratory Distress


Gastrointestinal:  normal bowel sounds, soft, tenderness (diffuse)


Extremity:  Normal Inspection, Normal Range of Motion


Neurologic/Psychiatric:  Alert, Oriented x3





Results


Lab


Laboratory Tests


5/4/22 17:22: Glucometer 96


5/4/22 23:59: Glucometer 96


5/5/22 05:24: 


White Blood Count 13.5H, Red Blood Count 2.73L, Hemoglobin 6.7*L, Hematocrit 23L

, Mean Corpuscular Volume 85, Mean Corpuscular Hemoglobin 25, Mean Corpuscular 

Hemoglobin Concent 29L, Red Cell Distribution Width 24.4H, Platelet Count 549H, 

Mean Platelet Volume 8.9L, Immature Granulocyte % (Auto) 2, Neutrophils (%) 

(Auto) 87H, Lymphocytes (%) (Auto) 5L, Monocytes (%) (Auto) 4, Eosinophils (%) 

(Auto) 1, Basophils (%) (Auto) 0, Neutrophils # (Auto) 11.8H, Lymphocytes # 

(Auto) 0.7L, Monocytes # (Auto) 0.6, Eosinophils # (Auto) 0.2, Basophils # 

(Auto) 0.0, Immature Granulocyte # (Auto) 0.3H, Sodium Level 138, Potassium 

Level 3.1L, Chloride Level 110H, Carbon Dioxide Level 18L, Anion Gap 10, Blood 

Urea Nitrogen 14, Creatinine 0.74, Estimat Glomerular Filtration Rate 83, BU

N/Creatinine Ratio 19, Glucose Level 78, Calcium Level 7.6L, Corrected Calcium 

9.4, Phosphorus Level 1.9L, Magnesium Level 1.8, Total Bilirubin 1.3H, Aspartate

Amino Transf (AST/SGOT) 10, Alanine Aminotransferase (ALT/SGPT) 7, Alkaline 

Phosphatase 389H, Total Protein 4.3L, Albumin 1.7L


5/5/22 05:32: Glucometer 77


5/5/22 07:44: Magnesium Level 1.8


5/5/22 11:52: Glucometer 105





Microbiology


5/1/22 Blood Culture - Preliminary, Resulted


         No growth


5/1/22 Gram Stain - Final, Complete


         


5/1/22 Body Fluid Culture - Final, Complete


         No growth


4/30/22 Urine Culture - Final, Complete


          NO GROWTH


4/30/22 MRSA Screen - Final, Complete


          MRSA not isolated





Assessment/Plan


Assessment/Plan


Assess & Plan/Chief Complaint


A 77 year old female with ascites s/p laparoscopic cholecystectomy POD#35.


VSS


WBC 13.5


Hgb 6.7 - 1 unit PRBCs ordered


IV abx.


Pain and nausea meds PRN


pt made hospice.











ISI HERRING APRN           May 5, 2022 15:02

## 2022-05-05 NOTE — OCC THERAPY PROGRESS NOTE
Therapy Progress Note


Per PT report.  Patient requests therapies dismiss her from our services because

she "wants to be left alone".  PT notified SW.  OT to dismiss patient from 

services at this time.











FABRIZIO RINALDI                May 5, 2022 12:53

## 2022-05-05 NOTE — PROGRESS NOTE - HOSPITALIST
Subjective


HPI/CC On Admission


Date Seen by Provider:  May 5, 2022


Time Seen by Provider:  11:00


Chief complaint: Abdominal pain suspected SBO





History present illness: This is a 77-year-old white female who transferred him 

from [facility] who Dr. SEQUEIRA has seen before for a prior 

cholecystectomy on 3/31/2022 who presented to the ICU with abdominal pain 

suspected SBO.  CT scan was not done indicated but it was done here which showed

ascites and no evidence of bowel obstruction.  Patient appears very frail and 

pale end stage and appears older than stated age of 77.  Paracentesis was 

performed and catheter still in place draining.  Patient appears to have 

neoplastic process likely hepatoma but further work-up will confirm.  

Paracentesis fluid sent for culture and cytology.  Daughter at the bedside 

reports she was doing great and in nearly "perfect health" until the abrupt 

onset of these issues but she appears to be very declined and frail which does 

not match up with the report from the daughter.  Apparently she has lost a lot 

of weight the last couple of months thinking it was her gallbladder but it 

appears she has cirrhosis and that would explain the significant decline in her 

status.


Subjective/Events-last exam


Pt is about the same


Paracentesis catheter leaked last night disturbed her sleep


Fentanyl patch will be given since all she is taking is Fentanyl IV and thinks 

Lortab makes her confused


Transfusing one unit of blood due to hemoglobin of 6.7


Potassium will be replaced due to level of 3.0





Review of Systems


General:  Fatigue, Malaise


Gastrointestinal:  Abdominal Pain





Objective


Exam


Vital Signs





Vital Signs








  Date Time  Temp Pulse Resp B/P (MAP) Pulse Ox O2 Delivery O2 Flow Rate FiO2


 


5/6/22 04:00 37.1 84 17 101/68 (79) 93 Room Air  


 


5/6/22 02:25       0.00 





Capillary Refill :


General Appearance:  No Apparent Distress, WD/WN, Chronically ill, Thin


Respiratory:  Lungs Clear, Normal Breath Sounds


Cardiovascular:  Regular Rate, Rhythm


Neurologic/Psychiatric:  Alert, Oriented x3





Results/Procedures


Lab


Patient resulted labs reviewed.





Assessment/Plan


Assessment and Plan


Assess & Plan/Chief Complaint


Assessment:


Abdominal pain


Presumed spontaneous bacterial peritonitis


Ascites status post paracentesis removing 2500 cc


Recent cholecystectomy on 3/31/2022 for hydrops


Recent weight loss


Former smoker


History of breast cancer


Cachexia





Plan:


Await cytology and culture


Antibiotics for presumed spontaneous bacterial peritonitis


Supportive care


DNR





5/2/2022:


Supportive care


Grave prognosis





5/3/22:


Hospice





5/4/22:


Await final decision from family


Poor prognosis





5/5/2022:


Transfuse


Potassium supplement


Needs hospice





Critical Care


Critically Ill Patient





Diagnosis/Problems


Diagnosis/Problems





(1) Spontaneous bacterial peritonitis


(2) Cirrhosis


(3) Sepsis


(4) Hypotension











VIJAYA LATHAM DO                 May 5, 2022 05:59

## 2022-05-06 VITALS — SYSTOLIC BLOOD PRESSURE: 111 MMHG | DIASTOLIC BLOOD PRESSURE: 63 MMHG

## 2022-05-06 VITALS — DIASTOLIC BLOOD PRESSURE: 72 MMHG | SYSTOLIC BLOOD PRESSURE: 109 MMHG

## 2022-05-06 VITALS — DIASTOLIC BLOOD PRESSURE: 62 MMHG | SYSTOLIC BLOOD PRESSURE: 91 MMHG

## 2022-05-06 VITALS — SYSTOLIC BLOOD PRESSURE: 101 MMHG | DIASTOLIC BLOOD PRESSURE: 68 MMHG

## 2022-05-06 VITALS — DIASTOLIC BLOOD PRESSURE: 69 MMHG | SYSTOLIC BLOOD PRESSURE: 110 MMHG

## 2022-05-06 VITALS — SYSTOLIC BLOOD PRESSURE: 109 MMHG | DIASTOLIC BLOOD PRESSURE: 68 MMHG

## 2022-05-06 VITALS — SYSTOLIC BLOOD PRESSURE: 92 MMHG | DIASTOLIC BLOOD PRESSURE: 68 MMHG

## 2022-05-06 LAB
%HYPO/RBC NFR BLD AUTO: SLIGHT %
ALBUMIN SERPL-MCNC: 2 GM/DL (ref 3.2–4.5)
ALP SERPL-CCNC: 407 U/L (ref 40–136)
ALT SERPL-CCNC: 9 U/L (ref 0–55)
ANISOCYTOSIS BLD QL SMEAR: (no result)
BASOPHILS # BLD AUTO: 0.1 10^3/UL (ref 0–0.1)
BASOPHILS NFR BLD AUTO: 0 % (ref 0–10)
BASOPHILS NFR BLD MANUAL: 0 %
BILIRUB SERPL-MCNC: 1.5 MG/DL (ref 0.1–1)
BUN/CREAT SERPL: 16
CALCIUM SERPL-MCNC: 7.8 MG/DL (ref 8.5–10.1)
CHLORIDE SERPL-SCNC: 109 MMOL/L (ref 98–107)
CO2 SERPL-SCNC: 17 MMOL/L (ref 21–32)
CREAT SERPL-MCNC: 0.69 MG/DL (ref 0.6–1.3)
EOSINOPHIL # BLD AUTO: 0.1 10^3/UL (ref 0–0.3)
EOSINOPHIL NFR BLD AUTO: 1 % (ref 0–10)
EOSINOPHIL NFR BLD MANUAL: 0 %
GFR SERPLBLD BASED ON 1.73 SQ M-ARVRAT: 89 ML/MIN
GLUCOSE SERPL-MCNC: 70 MG/DL (ref 70–105)
HCT VFR BLD CALC: 30 % (ref 35–52)
HGB BLD-MCNC: 9 G/DL (ref 11.5–16)
LYMPHOCYTES # BLD AUTO: 0.9 10^3/UL (ref 1–4)
LYMPHOCYTES NFR BLD AUTO: 4 % (ref 12–44)
MAGNESIUM SERPL-MCNC: 2 MG/DL (ref 1.6–2.4)
MANUAL DIFFERENTIAL PERFORMED BLD QL: YES
MCH RBC QN AUTO: 25 PG (ref 25–34)
MCHC RBC AUTO-ENTMCNC: 30 G/DL (ref 32–36)
MCV RBC AUTO: 82 FL (ref 80–99)
MONOCYTES # BLD AUTO: 0.6 10^3/UL (ref 0–1)
MONOCYTES NFR BLD AUTO: 3 % (ref 0–12)
MONOCYTES NFR BLD: 0 %
NEUTROPHILS # BLD AUTO: 19.4 10^3/UL (ref 1.8–7.8)
NEUTROPHILS NFR BLD AUTO: 89 % (ref 42–75)
NEUTS BAND NFR BLD MANUAL: 97 %
NEUTS BAND NFR BLD: 0 %
PHOSPHATE SERPL-MCNC: 1.8 MG/DL (ref 2.3–4.7)
PLATELET # BLD: 660 10^3/UL (ref 130–400)
PMV BLD AUTO: 8.4 FL (ref 9–12.2)
POLYCHROMASIA BLD QL SMEAR: (no result)
POTASSIUM SERPL-SCNC: 3.4 MMOL/L (ref 3.6–5)
PROT SERPL-MCNC: 5.1 GM/DL (ref 6.4–8.2)
SODIUM SERPL-SCNC: 138 MMOL/L (ref 135–145)
VARIANT LYMPHS NFR BLD MANUAL: 3 %
WBC # BLD AUTO: 21.8 10^3/UL (ref 4.3–11)

## 2022-05-06 RX ADMIN — SODIUM CHLORIDE SCH MLS/HR: 900 INJECTION, SOLUTION INTRAVENOUS at 15:24

## 2022-05-06 RX ADMIN — IPRATROPIUM BROMIDE AND ALBUTEROL SULFATE SCH ML: .5; 3 SOLUTION RESPIRATORY (INHALATION) at 18:47

## 2022-05-06 RX ADMIN — SODIUM CHLORIDE SCH MLS/HR: 900 INJECTION, SOLUTION INTRAVENOUS at 05:19

## 2022-05-06 RX ADMIN — NYSTATIN SCH ML: 100000 SUSPENSION ORAL at 06:42

## 2022-05-06 RX ADMIN — IPRATROPIUM BROMIDE AND ALBUTEROL SULFATE SCH ML: .5; 3 SOLUTION RESPIRATORY (INHALATION) at 07:22

## 2022-05-06 RX ADMIN — PANTOPRAZOLE SODIUM SCH MG: 40 INJECTION, POWDER, FOR SOLUTION INTRAVENOUS at 20:04

## 2022-05-06 RX ADMIN — IPRATROPIUM BROMIDE AND ALBUTEROL SULFATE SCH ML: .5; 3 SOLUTION RESPIRATORY (INHALATION) at 15:05

## 2022-05-06 RX ADMIN — SODIUM CHLORIDE SCH MLS/HR: 900 INJECTION, SOLUTION INTRAVENOUS at 19:27

## 2022-05-06 RX ADMIN — IPRATROPIUM BROMIDE AND ALBUTEROL SULFATE SCH ML: .5; 3 SOLUTION RESPIRATORY (INHALATION) at 02:24

## 2022-05-06 RX ADMIN — ANTACID TABLETS PRN MG: 500 TABLET, CHEWABLE ORAL at 20:04

## 2022-05-06 RX ADMIN — NYSTATIN SCH ML: 100000 SUSPENSION ORAL at 14:15

## 2022-05-06 RX ADMIN — ALUMINUM HYDROXIDE, MAGNESIUM HYDROXIDE, AND DIMETHICONE PRN ML: 400; 400; 40 SUSPENSION ORAL at 15:39

## 2022-05-06 RX ADMIN — IPRATROPIUM BROMIDE AND ALBUTEROL SULFATE SCH ML: .5; 3 SOLUTION RESPIRATORY (INHALATION) at 22:14

## 2022-05-06 RX ADMIN — NYSTATIN SCH ML: 100000 SUSPENSION ORAL at 21:57

## 2022-05-06 RX ADMIN — FENTANYL CITRATE PRN MCG: 50 INJECTION, SOLUTION INTRAMUSCULAR; INTRAVENOUS at 14:36

## 2022-05-06 RX ADMIN — SODIUM CHLORIDE SCH MLS/HR: 900 INJECTION, SOLUTION INTRAVENOUS at 20:30

## 2022-05-06 RX ADMIN — MELATONIN 3 MG ORAL TABLET PRN MG: 3 TABLET ORAL at 20:04

## 2022-05-06 RX ADMIN — ENOXAPARIN SODIUM SCH MG: 100 INJECTION SUBCUTANEOUS at 13:39

## 2022-05-06 RX ADMIN — ONDANSETRON PRN MG: 4 TABLET, ORALLY DISINTEGRATING ORAL at 01:41

## 2022-05-06 RX ADMIN — FENTANYL CITRATE PRN MCG: 50 INJECTION, SOLUTION INTRAMUSCULAR; INTRAVENOUS at 17:37

## 2022-05-06 RX ADMIN — FENTANYL CITRATE PRN MCG: 50 INJECTION, SOLUTION INTRAMUSCULAR; INTRAVENOUS at 21:56

## 2022-05-06 RX ADMIN — ANTACID TABLETS PRN MG: 500 TABLET, CHEWABLE ORAL at 01:41

## 2022-05-06 RX ADMIN — POTASSIUM CHLORIDE SCH MEQ: 1500 TABLET, EXTENDED RELEASE ORAL at 06:40

## 2022-05-06 RX ADMIN — IPRATROPIUM BROMIDE AND ALBUTEROL SULFATE SCH ML: .5; 3 SOLUTION RESPIRATORY (INHALATION) at 11:09

## 2022-05-06 RX ADMIN — DOCUSATE SODIUM AND SENNOSIDES SCH EA: 8.6; 5 TABLET, FILM COATED ORAL at 20:32

## 2022-05-06 RX ADMIN — DOCUSATE SODIUM AND SENNOSIDES SCH EA: 8.6; 5 TABLET, FILM COATED ORAL at 09:00

## 2022-05-06 NOTE — PROGRESS NOTE
Subjective


Date Seen by a Provider:  May 6, 2022


Time Seen by a Provider:  10:00


Subjective/Events-last exam


doing ok. tolerating diet but decreased appetite and intermittent nausea.





Objective


Exam





Vital Signs








  Date Time  Temp Pulse Resp B/P (MAP) Pulse Ox O2 Delivery O2 Flow Rate FiO2


 


5/6/22 07:33 37.1 75 18 111/63 (79) 96 Room Air  


 


5/6/22 07:23     91 Room Air  


 


5/6/22 04:00 37.1 84 17 101/68 (79) 93 Room Air  


 


5/6/22 02:25     94 Room Air 0.00 


 


5/6/22 00:00 37.1 90 17 91/62 (72) 94 Room Air  


 


5/5/22 22:10 37.7 87 16 124/84 94 Room Air  


 


5/5/22 22:08     93 Room Air  


 


5/5/22 19:40      Nasal Cannula 2.00 


 


5/5/22 19:01 36.6 86 16 106/70 (82) 95 Room Air  


 


5/5/22 18:49     92 Room Air 0.00 


 


5/5/22 18:43 36.6 83 20 112/72 93 Room Air  


 


5/5/22 15:25 36.8 81 16 109/68 (82) 96 Room Air  


 


5/5/22 11:56 36.6 83 16 102/64 (77) 97 Nasal Cannula 1.00 


 


5/5/22 10:45     98 Nasal Cannula 0.00 














I & O 


 


 5/6/22





 07:00


 


Intake Total 1680 ml


 


Output Total 704 ml


 


Balance 976 ml





Capillary Refill :


General Appearance:  No Apparent Distress


HEENT:  PERRL/EOMI


Neck:  Full Range of Motion


Respiratory:  Chest Non Tender, Decreased Breath Sounds


Cardiovascular:  Regular Rate, Rhythm


Gastrointestinal:  normal bowel sounds, soft


Extremity:  Normal Capillary Refill


Neurologic/Psychiatric:  Alert, Oriented x3


Skin:  Normal Color


Lymphatic:  No Adenopathy





Results


Lab


Laboratory Tests


5/5/22 11:52: Glucometer 105


5/5/22 18:01: Glucometer 90


5/6/22 00:32: Glucometer 78


5/6/22 05:25: Glucometer 70


5/6/22 06:43: 


White Blood Count 21.8H, Red Blood Count 3.65L, Hemoglobin 9.0#L, Hematocrit 30L

, Mean Corpuscular Volume 82, Mean Corpuscular Hemoglobin 25, Mean Corpuscular 

Hemoglobin Concent 30L, Red Cell Distribution Width 22.5H, Platelet Count 660H, 

Mean Platelet Volume 8.4L, Immature Granulocyte % (Auto) 3, Neutrophils (%) 

(Auto) 89H, Lymphocytes (%) (Auto) 4L, Monocytes (%) (Auto) 3, Eosinophils (%) 

(Auto) 1, Basophils (%) (Auto) 0, Neutrophils # (Auto) 19.4H, Lymphocytes # 

(Auto) 0.9L, Monocytes # (Auto) 0.6, Eosinophils # (Auto) 0.1, Basophils # 

(Auto) 0.1, Immature Granulocyte # (Auto) 0.7H, Neutrophils % (Manual) 97, 

Lymphocytes % (Manual) 3, Monocytes % (Manual) 0, Eosinophils % (Manual) 0, 

Basophils % (Manual) 0, Band Neutrophils 0, Polychromasia MODERATE, 

Hypochromasia SLIGHT, Anisocytosis MARKED, Sodium Level 138, Potassium Level 

3.4L, Chloride Level 109H, Carbon Dioxide Level 17L, Anion Gap 12, Blood Urea 

Nitrogen 11, Creatinine 0.69, Estimat Glomerular Filtration Rate 89, 

BUN/Creatinine Ratio 16, Glucose Level 70, Calcium Level 7.8L, Corrected Calcium

9.4, Phosphorus Level 1.8L, Magnesium Level 2.0, Total Bilirubin 1.5H, Aspartate

Amino Transf (AST/SGOT) 11, Alanine Aminotransferase (ALT/SGPT) 9, Alkaline 

Phosphatase 407H, Total Protein 5.1L, Albumin 2.0L





Microbiology


5/1/22 Blood Culture - Preliminary, Resulted


         No growth


5/1/22 Gram Stain - Final, Complete


         


5/1/22 Body Fluid Culture - Final, Complete


         No growth


4/30/22 Urine Culture - Final, Complete


          NO GROWTH


4/30/22 MRSA Screen - Final, Complete


          MRSA not isolated





Assessment/Plan


Assessment/Plan


Assess & Plan/Chief Complaint


ascites s/p laparoscopic cholecystectomy POD#34.


IV abx.


will proceed with paracentesis and send for c&s and labs.


pt made hospice.


cont abx.


if recurrent ascites then may need tunneled peritoneal cath for long term 

drainage.











AZALEA SEQUEIRA MD                 May 6, 2022 10:26

## 2022-05-06 NOTE — PROGRESS NOTE - HOSPITALIST
Subjective


HPI/CC On Admission


Date Seen by Provider:  May 6, 2022


Time Seen by Provider:  11:00


Chief complaint: Abdominal pain suspected SBO





History present illness: This is a 77-year-old white female who transferred him 

from [facility] who Dr. SEQUEIRA has seen before for a prior 

cholecystectomy on 3/31/2022 who presented to the ICU with abdominal pain 

suspected SBO.  CT scan was not done indicated but it was done here which showed

ascites and no evidence of bowel obstruction.  Patient appears very frail and 

pale end stage and appears older than stated age of 77.  Paracentesis was 

performed and catheter still in place draining.  Patient appears to have 

neoplastic process likely hepatoma but further work-up will confirm.  

Paracentesis fluid sent for culture and cytology.  Daughter at the bedside 

reports she was doing great and in nearly "perfect health" until the abrupt 

onset of these issues but she appears to be very declined and frail which does 

not match up with the report from the daughter.  Apparently she has lost a lot 

of weight the last couple of months thinking it was her gallbladder but it 

appears she has cirrhosis and that would explain the significant decline in her 

status.


Subjective/Events-last exam


Pt is having reaccumulation of the ascitic fluid


White count elevated at 21.8


Antibiotics continued


Dr. Sequeira will place a tunneled catheter for a continuous paracentesis on Monday


Pt is a hospice candidate





Review of Systems


General:  Fatigue, Malaise


Gastrointestinal:  Abdominal Pain





Objective


Exam


Vital Signs





Vital Signs








  Date Time  Temp Pulse Resp B/P (MAP) Pulse Ox O2 Delivery O2 Flow Rate FiO2


 


5/7/22 03:43 36.0 88 18 118/65 (82) 96 Room Air  


 


5/6/22 02:25       0.00 





Capillary Refill :


General Appearance:  No Apparent Distress, WD/WN, Chronically ill


Respiratory:  Lungs Clear, Normal Breath Sounds


Cardiovascular:  Regular Rate, Rhythm


Neurologic/Psychiatric:  Alert, Depressed Affect





Results/Procedures


Lab


Laboratory Tests


5/6/22 06:43








Patient resulted labs reviewed.





Assessment/Plan


Assessment and Plan


Assess & Plan/Chief Complaint


Assessment:


Abdominal pain


Presumed spontaneous bacterial peritonitis


Ascites status post paracentesis removing 2500 cc


Recent cholecystectomy on 3/31/2022 for hydrops


Recent weight loss


Former smoker


History of breast cancer


Cachexia





Plan:


Await cytology and culture


Antibiotics for presumed spontaneous bacterial peritonitis


Supportive care


DNR





5/2/2022:


Supportive care


Grave prognosis





5/3/22:


Hospice





5/4/22:


Await final decision from family


Poor prognosis





5/5/2022:


Transfuse


Potassium supplement


Needs hospice





5/6/2022:


Supportive care


Hospice next week





Critical Care


Critically Ill Patient





Diagnosis/Problems


Diagnosis/Problems





(1) Spontaneous bacterial peritonitis


(2) Cirrhosis


(3) Sepsis


(4) Hypotension











VIJAYA LATHAM DO                 May 6, 2022 07:03

## 2022-05-06 NOTE — DISCHARGE INST-SURGICAL
D/C Lap Instructions-HUA


Follow Up Appt in 2 weeks





Activity as tolerated


No driving for 24 hours


No driving while on pain medications





Incentive Spirometry use every 2 hours while awake





Regular Diet





Symptoms to Report: Fever over 101 degree F, Nausea/Vomiting 


Infection Signs and Symptoms to report:  Increased redness, Foul odor of wound, 

Increased drainage





Bathing instructions: May shower


Operative Area Clean/Dry;  Keep incision clean/dry





If any problems/questions: Contact your physician or go to Emergency Room











AZALEA SEQUEIRA MD                 May 6, 2022 10:28

## 2022-05-07 VITALS — SYSTOLIC BLOOD PRESSURE: 128 MMHG | DIASTOLIC BLOOD PRESSURE: 83 MMHG

## 2022-05-07 VITALS — SYSTOLIC BLOOD PRESSURE: 118 MMHG | DIASTOLIC BLOOD PRESSURE: 65 MMHG

## 2022-05-07 LAB
ALBUMIN SERPL-MCNC: 1.9 GM/DL (ref 3.2–4.5)
ALP SERPL-CCNC: 373 U/L (ref 40–136)
ALT SERPL-CCNC: 10 U/L (ref 0–55)
BASOPHILS # BLD AUTO: 0.1 10^3/UL (ref 0–0.1)
BASOPHILS NFR BLD AUTO: 1 % (ref 0–10)
BILIRUB SERPL-MCNC: 1.2 MG/DL (ref 0.1–1)
BUN/CREAT SERPL: 14
CALCIUM SERPL-MCNC: 7.6 MG/DL (ref 8.5–10.1)
CHLORIDE SERPL-SCNC: 112 MMOL/L (ref 98–107)
CO2 SERPL-SCNC: 16 MMOL/L (ref 21–32)
CREAT SERPL-MCNC: 0.64 MG/DL (ref 0.6–1.3)
EOSINOPHIL # BLD AUTO: 0.4 10^3/UL (ref 0–0.3)
EOSINOPHIL NFR BLD AUTO: 3 % (ref 0–10)
GFR SERPLBLD BASED ON 1.73 SQ M-ARVRAT: 90 ML/MIN
GLUCOSE SERPL-MCNC: 65 MG/DL (ref 70–105)
HCT VFR BLD CALC: 26 % (ref 35–52)
HGB BLD-MCNC: 7.5 G/DL (ref 11.5–16)
LYMPHOCYTES # BLD AUTO: 0.9 10^3/UL (ref 1–4)
LYMPHOCYTES NFR BLD AUTO: 7 % (ref 12–44)
MAGNESIUM SERPL-MCNC: 1.9 MG/DL (ref 1.6–2.4)
MANUAL DIFFERENTIAL PERFORMED BLD QL: NO
MCH RBC QN AUTO: 25 PG (ref 25–34)
MCHC RBC AUTO-ENTMCNC: 29 G/DL (ref 32–36)
MCV RBC AUTO: 87 FL (ref 80–99)
MONOCYTES # BLD AUTO: 0.5 10^3/UL (ref 0–1)
MONOCYTES NFR BLD AUTO: 4 % (ref 0–12)
NEUTROPHILS # BLD AUTO: 10.8 10^3/UL (ref 1.8–7.8)
NEUTROPHILS NFR BLD AUTO: 81 % (ref 42–75)
PHOSPHATE SERPL-MCNC: 2 MG/DL (ref 2.3–4.7)
PLATELET # BLD: 480 10^3/UL (ref 130–400)
PMV BLD AUTO: 9.2 FL (ref 9–12.2)
POTASSIUM SERPL-SCNC: 3.6 MMOL/L (ref 3.6–5)
PROT SERPL-MCNC: 4.8 GM/DL (ref 6.4–8.2)
SODIUM SERPL-SCNC: 138 MMOL/L (ref 135–145)
WBC # BLD AUTO: 13.3 10^3/UL (ref 4.3–11)

## 2022-05-07 RX ADMIN — ALUMINUM HYDROXIDE, MAGNESIUM HYDROXIDE, AND DIMETHICONE PRN ML: 400; 400; 40 SUSPENSION ORAL at 08:34

## 2022-05-07 RX ADMIN — FENTANYL CITRATE PRN MCG: 50 INJECTION, SOLUTION INTRAMUSCULAR; INTRAVENOUS at 19:54

## 2022-05-07 RX ADMIN — POTASSIUM CHLORIDE SCH MEQ: 1500 TABLET, EXTENDED RELEASE ORAL at 05:21

## 2022-05-07 RX ADMIN — FENTANYL CITRATE PRN MCG: 50 INJECTION, SOLUTION INTRAMUSCULAR; INTRAVENOUS at 07:29

## 2022-05-07 RX ADMIN — FENTANYL CITRATE PRN MCG: 50 INJECTION, SOLUTION INTRAMUSCULAR; INTRAVENOUS at 14:56

## 2022-05-07 RX ADMIN — FENTANYL CITRATE PRN MCG: 50 INJECTION, SOLUTION INTRAMUSCULAR; INTRAVENOUS at 04:35

## 2022-05-07 RX ADMIN — DOCUSATE SODIUM AND SENNOSIDES SCH EA: 8.6; 5 TABLET, FILM COATED ORAL at 07:48

## 2022-05-07 RX ADMIN — NYSTATIN SCH ML: 100000 SUSPENSION ORAL at 05:09

## 2022-05-07 RX ADMIN — PANTOPRAZOLE SODIUM SCH MG: 40 INJECTION, POWDER, FOR SOLUTION INTRAVENOUS at 07:53

## 2022-05-07 RX ADMIN — ONDANSETRON PRN MG: 2 INJECTION, SOLUTION INTRAMUSCULAR; INTRAVENOUS at 18:03

## 2022-05-07 RX ADMIN — FENTANYL CITRATE PRN MCG: 50 INJECTION, SOLUTION INTRAMUSCULAR; INTRAVENOUS at 10:15

## 2022-05-07 RX ADMIN — FENTANYL CITRATE PRN MCG: 50 INJECTION, SOLUTION INTRAMUSCULAR; INTRAVENOUS at 02:03

## 2022-05-07 RX ADMIN — IPRATROPIUM BROMIDE AND ALBUTEROL SULFATE SCH ML: .5; 3 SOLUTION RESPIRATORY (INHALATION) at 06:57

## 2022-05-07 RX ADMIN — IPRATROPIUM BROMIDE AND ALBUTEROL SULFATE SCH ML: .5; 3 SOLUTION RESPIRATORY (INHALATION) at 02:15

## 2022-05-07 RX ADMIN — SODIUM CHLORIDE SCH MLS/HR: 900 INJECTION, SOLUTION INTRAVENOUS at 01:32

## 2022-05-07 RX ADMIN — DOCUSATE SODIUM AND SENNOSIDES SCH EA: 8.6; 5 TABLET, FILM COATED ORAL at 21:00

## 2022-05-07 RX ADMIN — FENTANYL CITRATE PRN MCG: 50 INJECTION, SOLUTION INTRAMUSCULAR; INTRAVENOUS at 11:43

## 2022-05-07 RX ADMIN — FENTANYL CITRATE PRN MCG: 50 INJECTION, SOLUTION INTRAMUSCULAR; INTRAVENOUS at 18:03

## 2022-05-07 NOTE — PROGRESS NOTE - HOSPITALIST
Subjective


HPI/CC On Admission


Date Seen by Provider:  May 7, 2022


Time Seen by Provider:  09:30


Chief complaint: Abdominal pain suspected SBO





History present illness: This is a 77-year-old white female who transferred him 

from [facility] who Dr. SEQUEIRA has seen before for a prior 

cholecystectomy on 3/31/2022 who presented to the ICU with abdominal pain 

suspected SBO.  CT scan was not done indicated but it was done here which showed

ascites and no evidence of bowel obstruction.  Patient appears very frail and 

pale end stage and appears older than stated age of 77.  Paracentesis was 

performed and catheter still in place draining.  Patient appears to have 

neoplastic process likely hepatoma but further work-up will confirm.  

Paracentesis fluid sent for culture and cytology.  Daughter at the bedside 

reports she was doing great and in nearly "perfect health" until the abrupt 

onset of these issues but she appears to be very declined and frail which does 

not match up with the report from the daughter.  Apparently she has lost a lot 

of weight the last couple of months thinking it was her gallbladder but it 

appears she has cirrhosis and that would explain the significant decline in her 

status.


Subjective/Events-last exam


Patient doing well


Patient reports pain is under control


Further the bedside


Comfort care protocol orders initiated





Objective


Exam


Vital Signs





Vital Signs








  Date Time  Temp Pulse Resp B/P (MAP) Pulse Ox O2 Delivery O2 Flow Rate FiO2


 


5/7/22 08:40      Room Air  


 


5/7/22 07:34 36.7 97 18 128/83 (98) 97   


 


5/6/22 02:25       0.00 





Capillary Refill :


General Appearance:  No Apparent Distress, WD/WN


HEENT:  PERRL/EOMI


Respiratory:  Lungs Clear


Cardiovascular:  Regular Rate, Rhythm





Results/Procedures


Lab


Laboratory Tests


5/7/22 05:45








Patient resulted labs reviewed.





Assessment/Plan


Assessment and Plan


Assess & Plan/Chief Complaint


Assessment:


Abdominal pain


Presumed spontaneous bacterial peritonitis


Ascites status post paracentesis removing 2500 cc


Recent cholecystectomy on 3/31/2022 for hydrops


Recent weight loss


Former smoker


History of breast cancer


Cachexia





Plan:


Await cytology and culture


Antibiotics for presumed spontaneous bacterial peritonitis


Supportive care


DNR





5/2/2022:


Supportive care


Grave prognosis





5/3/22:


Hospice





5/4/22:


Await final decision from family


Poor prognosis





5/5/2022:


Transfuse


Potassium supplement


Needs hospice





5/6/2022:


Supportive care


Hospice next week





5/7/2022:


Comfort care protocol





Critical Care


Critically Ill Patient





Diagnosis/Problems


Diagnosis/Problems





(1) Spontaneous bacterial peritonitis


(2) Cirrhosis


(3) Sepsis


(4) Hypotension











VIJAYA LATHAM DO                 May 7, 2022 06:36

## 2022-05-07 NOTE — PROGRESS NOTE - SURGERY
Subjective


Time Seen by a Provider:  10:34


Subjective/Events-last exam


Pt seen and  examined, no new complaints.  States she is still having some 

leaking from the abdomen.


Review of Systems


General:  Fatigue


Pulmonary:  No Dyspnea, No Cough


Cardiovascular:  No: Chest Pain, Palpitations


Gastrointestinal:  Other (distention); No: Nausea, Vomiting





Objective


Exam





Vital Signs








  Date Time  Temp Pulse Resp B/P (MAP) Pulse Ox O2 Delivery O2 Flow Rate FiO2


 


5/7/22 08:40      Room Air  


 


5/7/22 07:34 36.7 97 18 128/83 (98) 97 Room Air  


 


5/7/22 06:57     96 Room Air  


 


5/7/22 03:43 36.0 88 18 118/65 (82) 96 Room Air  


 


5/7/22 02:15     95 Room Air  


 


5/6/22 23:56 36.6 91 18 92/68 (76) 95 Room Air  


 


5/6/22 22:14     95 Room Air  


 


5/6/22 20:13      Room Air  


 


5/6/22 19:53 38.0 95 19 109/72 (84) 93 Room Air  


 


5/6/22 18:47     94 Room Air  


 


5/6/22 15:52 37.3 93 18 110/69 (83) 95 Room Air  


 


5/6/22 15:05     96 Room Air  


 


5/6/22 11:59 37.2 76 18 109/68 (82) 95 Room Air  


 


5/6/22 11:09     96 Room Air  














I & O 


 


 5/7/22





 07:00


 


Intake Total 1720 ml


 


Output Total 628 ml


 


Balance 1092 ml





Capillary Refill :


General Appearance:  No Apparent Distress, Chronically ill


HEENT:  PERRL/EOMI


Respiratory:  Lungs Clear, Normal Breath Sounds


Cardiovascular:  Regular Rate, Rhythm


Gastrointestinal:  normal bowel sounds, soft, other (distended, bandage covering

previous paracentesis site)


Neurologic/Psychiatric:  Alert, Depressed Affect





Results


Lab


Laboratory Tests


5/6/22 11:56: Glucometer 82


5/6/22 17:55: Glucometer 80


5/7/22 05:45: 


White Blood Count 13.3H, Red Blood Count 2.96L, Hemoglobin 7.5L, Hematocrit 26L,

Mean Corpuscular Volume 87, Mean Corpuscular Hemoglobin 25, Mean Corpuscular 

Hemoglobin Concent 29L, Red Cell Distribution Width 23.8H, Platelet Count 480H, 

Mean Platelet Volume 9.2, Immature Granulocyte % (Auto) 5, Neutrophils (%) 

(Auto) 81H, Lymphocytes (%) (Auto) 7L, Monocytes (%) (Auto) 4, Eosinophils (%) 

(Auto) 3, Basophils (%) (Auto) 1, Neutrophils # (Auto) 10.8H, Lymphocytes # 

(Auto) 0.9L, Monocytes # (Auto) 0.5, Eosinophils # (Auto) 0.4H, Basophils # 

(Auto) 0.1, Immature Granulocyte # (Auto) 0.6H, Percent Immature Platelet 

Fraction 2.0, Sodium Level 138, Potassium Level 3.6, Chloride Level 112H, Carbon

Dioxide Level 16L, Anion Gap 10, Blood Urea Nitrogen 9, Creatinine 0.64, Estimat

Glomerular Filtration Rate 90, BUN/Creatinine Ratio 14, Glucose Level 65L, 

Calcium Level 7.6L, Corrected Calcium 9.3, Phosphorus Level 2.0L, Magnesium 

Level 1.9, Total Bilirubin 1.2H, Aspartate Amino Transf (AST/SGOT) 17, Alanine 

Aminotransferase (ALT/SGPT) 10, Alkaline Phosphatase 373H, Total Protein 4.8L, 

Albumin 1.9L





Microbiology


5/1/22 Blood Culture - Final, Complete


         No growth


5/1/22 Gram Stain - Final, Complete


         


5/1/22 Body Fluid Culture - Final, Complete


         No growth


4/30/22 Urine Culture - Final, Complete


          NO GROWTH


4/30/22 MRSA Screen - Final, Complete


          MRSA not isolated





Assessment/Plan


Ascites





Pt will need Pleur-X catheter placed, either Monday or Tuesday











RORY CORCORAN DO                May 7, 2022 11:01

## 2022-05-08 RX ADMIN — FENTANYL CITRATE PRN MCG: 50 INJECTION, SOLUTION INTRAMUSCULAR; INTRAVENOUS at 03:14

## 2022-05-08 RX ADMIN — HYDROCODONE BITARTRATE AND ACETAMINOPHEN PRN EA: 5; 325 TABLET ORAL at 18:06

## 2022-05-08 RX ADMIN — MORPHINE SULFATE PRN MG: 4 INJECTION, SOLUTION INTRAMUSCULAR; INTRAVENOUS at 18:08

## 2022-05-08 RX ADMIN — MORPHINE SULFATE PRN MG: 4 INJECTION, SOLUTION INTRAMUSCULAR; INTRAVENOUS at 08:15

## 2022-05-08 RX ADMIN — HYDROCODONE BITARTRATE AND ACETAMINOPHEN PRN EA: 5; 325 TABLET ORAL at 13:26

## 2022-05-08 RX ADMIN — DOCUSATE SODIUM AND SENNOSIDES SCH EA: 8.6; 5 TABLET, FILM COATED ORAL at 20:58

## 2022-05-08 RX ADMIN — DOCUSATE SODIUM AND SENNOSIDES SCH EA: 8.6; 5 TABLET, FILM COATED ORAL at 09:27

## 2022-05-08 RX ADMIN — HYDROCODONE BITARTRATE AND ACETAMINOPHEN PRN EA: 5; 325 TABLET ORAL at 09:29

## 2022-05-08 RX ADMIN — FENTANYL SCH MCG: 12 PATCH TRANSDERMAL at 11:03

## 2022-05-08 RX ADMIN — FENTANYL CITRATE PRN MCG: 50 INJECTION, SOLUTION INTRAMUSCULAR; INTRAVENOUS at 00:04

## 2022-05-08 RX ADMIN — ANTACID TABLETS PRN MG: 500 TABLET, CHEWABLE ORAL at 03:13

## 2022-05-08 RX ADMIN — MORPHINE SULFATE PRN MG: 4 INJECTION, SOLUTION INTRAMUSCULAR; INTRAVENOUS at 01:16

## 2022-05-08 RX ADMIN — MORPHINE SULFATE PRN MG: 4 INJECTION, SOLUTION INTRAMUSCULAR; INTRAVENOUS at 05:09

## 2022-05-08 RX ADMIN — MORPHINE SULFATE PRN MG: 4 INJECTION, SOLUTION INTRAMUSCULAR; INTRAVENOUS at 15:31

## 2022-05-08 NOTE — PROGRESS NOTE - SURGERY
Subjective


Time Seen by a Provider:  12:34


Subjective/Events-last exam


Pt seen and examined, states she is doing better today and "ate twice".  Denies 

abd pain.


Review of Systems


HEENT:  No Head Aches, No Visual Changes


Pulmonary:  No Dyspnea, No Cough


Cardiovascular:  No: Chest Pain, Palpitations


Gastrointestinal:  No: Nausea, Vomiting





Objective


Exam





Vital Signs








  Date Time  Temp Pulse Resp B/P (MAP) Pulse Ox O2 Delivery O2 Flow Rate FiO2


 


5/8/22 08:30      Room Air 0.00 


 


5/8/22 03:03      Room Air  


 


5/7/22 19:57      Room Air  














I & O 


 


 5/8/22





 07:00


 


Intake Total 2800 ml


 


Output Total 700 ml


 


Balance 2100 ml





Capillary Refill :


General Appearance:  No Apparent Distress, WD/WN


HEENT:  PERRL/EOMI


Respiratory:  Lungs Clear


Cardiovascular:  Regular Rate, Rhythm


Gastrointestinal:  normal bowel sounds, soft, other (distended, bandage covering

previous paracentesis site)


Neurologic/Psychiatric:  Alert, Depressed Affect





Results


Lab





Microbiology


5/1/22 Blood Culture - Final, Complete


         No growth


5/1/22 Gram Stain - Final, Complete


         


5/1/22 Body Fluid Culture - Final, Complete


         No growth


4/30/22 Urine Culture - Final, Complete


          NO GROWTH


4/30/22 MRSA Screen - Final, Complete


          MRSA not isolated





Assessment/Plan


Ascites





Pt will need Pleur-X catheter placed, either Monday or Tuesday











RORY CORCORAN DO                May 8, 2022 14:08

## 2022-05-08 NOTE — PROGRESS NOTE - HOSPITALIST
Subjective


HPI/CC On Admission


Date Seen by Provider:  May 8, 2022


Time Seen by Provider:  10:00


Chief complaint: Abdominal pain suspected SBO





History present illness: This is a 77-year-old white female who transferred him 

from [facility] who Dr. SEQUEIRA has seen before for a prior 

cholecystectomy on 3/31/2022 who presented to the ICU with abdominal pain 

suspected SBO.  CT scan was not done indicated but it was done here which showed

ascites and no evidence of bowel obstruction.  Patient appears very frail and 

pale end stage and appears older than stated age of 77.  Paracentesis was 

performed and catheter still in place draining.  Patient appears to have 

neoplastic process likely hepatoma but further work-up will confirm.  

Paracentesis fluid sent for culture and cytology.  Daughter at the bedside 

reports she was doing great and in nearly "perfect health" until the abrupt 

onset of these issues but she appears to be very declined and frail which does 

not match up with the report from the daughter.  Apparently she has lost a lot 

of weight the last couple of months thinking it was her gallbladder but it 

appears she has cirrhosis and that would explain the significant decline in her 

status.


Subjective/Events-last exam


Patient has no new issues


Pain is really controlled well


Brother is at the bedside


No concerns at this point





Review of Systems


Gastrointestinal:  Abdominal Pain





Objective


Exam


Vital Signs





Vital Signs








  Date Time  Temp Pulse Resp B/P (MAP) Pulse Ox O2 Delivery O2 Flow Rate FiO2


 


5/8/22 20:18      Room Air  


 


5/8/22 08:30       0.00 


 


5/7/22 07:34 36.7 97 18 128/83 (98) 97   





Capillary Refill :


General Appearance:  No Apparent Distress, WD/WN, Chronically ill


Respiratory:  Lungs Clear, Normal Breath Sounds


Cardiovascular:  Regular Rate, Rhythm


Neurologic/Psychiatric:  Alert, Oriented x3





Results/Procedures


Lab


Patient resulted labs reviewed.





Assessment/Plan


Assessment and Plan


Assess & Plan/Chief Complaint


Assessment:


Abdominal pain


Presumed spontaneous bacterial peritonitis


Ascites status post paracentesis removing 2500 cc


Recent cholecystectomy on 3/31/2022 for hydrops


Recent weight loss


Former smoker


History of breast cancer


Cachexia





Plan:


Await cytology and culture


Antibiotics for presumed spontaneous bacterial peritonitis


Supportive care


DNR





5/2/2022:


Supportive care


Grave prognosis





5/3/22:


Hospice





5/4/22:


Await final decision from family


Poor prognosis





5/5/2022:


Transfuse


Potassium supplement


Needs hospice





5/6/2022:


Supportive care


Hospice next week





5/7/2022:


Comfort care protocol





5/8/2022:


Paracentesis catheter tunneled type will be placed tomorrow





Critical Care


Critically Ill Patient





Diagnosis/Problems


Diagnosis/Problems





(1) Spontaneous bacterial peritonitis


(2) Cirrhosis


(3) Sepsis


(4) Hypotension











VIJAYA LATHAM DO                 May 8, 2022 06:58

## 2022-05-09 RX ADMIN — DOCUSATE SODIUM AND SENNOSIDES SCH EA: 8.6; 5 TABLET, FILM COATED ORAL at 19:20

## 2022-05-09 RX ADMIN — MORPHINE SULFATE PRN MG: 4 INJECTION, SOLUTION INTRAMUSCULAR; INTRAVENOUS at 04:04

## 2022-05-09 RX ADMIN — MORPHINE SULFATE PRN MG: 4 INJECTION, SOLUTION INTRAMUSCULAR; INTRAVENOUS at 10:57

## 2022-05-09 RX ADMIN — MORPHINE SULFATE PRN MG: 4 INJECTION, SOLUTION INTRAMUSCULAR; INTRAVENOUS at 07:55

## 2022-05-09 RX ADMIN — HYDROCODONE BITARTRATE AND ACETAMINOPHEN PRN EA: 5; 325 TABLET ORAL at 16:16

## 2022-05-09 RX ADMIN — HYDROCODONE BITARTRATE AND ACETAMINOPHEN PRN EA: 5; 325 TABLET ORAL at 04:04

## 2022-05-09 RX ADMIN — HYDROCODONE BITARTRATE AND ACETAMINOPHEN PRN EA: 5; 325 TABLET ORAL at 12:20

## 2022-05-09 RX ADMIN — DOCUSATE SODIUM AND SENNOSIDES SCH EA: 8.6; 5 TABLET, FILM COATED ORAL at 07:36

## 2022-05-09 RX ADMIN — ALUMINUM HYDROXIDE, MAGNESIUM HYDROXIDE, AND DIMETHICONE PRN ML: 400; 400; 40 SUSPENSION ORAL at 13:50

## 2022-05-09 NOTE — PROGRESS NOTE
Subjective


Date Seen by a Provider:  May 9, 2022


Time Seen by a Provider:  14:00


Subjective/Events-last exam


doing ok. still very weak. able to tolerate liquids/ensure. recurrent ascites. 

no fever/chills.





Objective


Exam





Vital Signs








  Date Time  Temp Pulse Resp B/P (MAP) Pulse Ox O2 Delivery O2 Flow Rate FiO2


 


5/9/22 08:10     97 Room Air  


 


5/8/22 20:18      Room Air  














I & O 


 


 5/9/22





 07:00


 


Intake Total 1500 ml


 


Output Total 475 ml


 


Balance 1025 ml





Capillary Refill :


General Appearance:  No Apparent Distress


HEENT:  PERRL/EOMI


Neck:  Full Range of Motion


Respiratory:  Chest Non Tender, Decreased Breath Sounds


Cardiovascular:  Regular Rate, Rhythm


Gastrointestinal:  soft, distended


Extremity:  Normal Capillary Refill


Neurologic/Psychiatric:  Alert, Oriented x3


Skin:  Normal Color


Lymphatic:  No Adenopathy





Results


Lab





Microbiology


5/1/22 Blood Culture - Final, Complete


         No growth


5/1/22 Gram Stain - Final, Complete


         


5/1/22 Body Fluid Culture - Final, Complete


         No growth


4/30/22 Urine Culture - Final, Complete


          NO GROWTH


4/30/22 MRSA Screen - Final, Complete


          MRSA not isolated





Assessment/Plan


Assessment/Plan


Assess & Plan/Chief Complaint


ascites s/p laparoscopic cholecystectomy POD#40.


will proceed with tunneled peritoneal catheter placement for recurrent ascites.


pt made hospice. after drain placement may be transferred to SNF/hospice.











AZALEA SEQUEIRA MD                 May 9, 2022 15:02

## 2022-05-09 NOTE — PROGRESS NOTE
Subjective


Subjective/Events-last exam


Patient having some pain this AM. States that the PO meds were helping yesterday

but now she feels like she can't keep up with her pain. Tolerating PO diet. 

Patient and family wishes to proceed with SNF.


Review of Systems


General:  Fatigue


Pulmonary:  No Dyspnea, No Cough


Cardiovascular:  Edema; No: Chest Pain, Palpitations


Gastrointestinal:  Abdominal Pain; No: Nausea, Vomiting


Neurological:  Weakness, Incoordination; No: Confusion





Objective


Exam


Last Set of Vital Signs





Vital Signs








  Date Time  Temp Pulse Resp B/P (MAP) Pulse Ox O2 Delivery O2 Flow Rate FiO2


 


5/9/22 08:10     97 Room Air  


 


5/8/22 08:30       0.00 


 


5/7/22 07:34 36.7 97 18 128/83 (98)    





Capillary Refill :


I&O











Intake and Output 


 


 5/9/22





 00:00


 


Intake Total 1500 ml


 


Output Total 550 ml


 


Balance 950 ml


 


 


 


Intake Oral 1500 ml


 


Output Urine Total 550 ml


 


# Bowel Movements 1








General:  Alert, Oriented X3, No Acute Distress


Lungs:  Clear to Auscultation, Normal Air Movement


Heart:  Regular Rate, No Murmurs


Abdomen:  Other (distended abdomen with fluid wave, diffuse ttp, normal bowel 

sounds)


Neuro:  Normal Speech


Psych/Mental Status:  Mental Status NL, Mood NL





Results/Procedures


Lab





Microbiology


5/1/22 Blood Culture - Final, Complete


         No growth


5/1/22 Gram Stain - Final, Complete


         


5/1/22 Body Fluid Culture - Final, Complete


         No growth


4/30/22 Urine Culture - Final, Complete


          NO GROWTH


4/30/22 MRSA Screen - Final, Complete


          MRSA not isolated





Assessment/Plan


Assessment/Plan





(1) Spontaneous bacterial peritonitis


Status:  Acute


Assessment & Plan:  5/9: Continue to cover for infection, cultures thus far are 

negative





(2) Ascites of liver


Status:  Acute


Assessment & Plan:  5/9: Plan for pleur-ex drain placement tomorrow by Dr Martinez





(3) Cirrhosis


Status:  Chronic


Qualifiers:  


   Qualified Codes:  K74.69 - Other cirrhosis of liver


(4) Normocytic anemia


Status:  Acute


Assessment & Plan:  5/9: Stable, no signs of acute bleeding, INR 1.3





(5) Severe protein-calorie malnutrition


Status:  Chronic


(6) H/O malignant neoplasm of breast


(7) Discharge planning issues


Assessment & Plan:  5/9: Plan for SNF placement, PT ordered today, family 

reasonable and understand the advanced stage of disease and have considered 

hospice














CHANTEL GARVEY MD                May 9, 2022 12:12

## 2022-05-09 NOTE — DIAGNOSTIC IMAGING REPORT
Indication: Ascites



Ultrasound in all 4 quadrants demonstrates a large amount of

ascites in all 4 quadrants. Dr. Martinez performed paracentesis, see

his dictation for further details.



Impression:  Ultrasound images of the abdomen demonstrate large

amount of ascites.



Dictated by: 



  Dictated on workstation # [id number]

## 2022-05-10 VITALS — DIASTOLIC BLOOD PRESSURE: 56 MMHG | SYSTOLIC BLOOD PRESSURE: 83 MMHG

## 2022-05-10 VITALS — SYSTOLIC BLOOD PRESSURE: 83 MMHG | DIASTOLIC BLOOD PRESSURE: 57 MMHG

## 2022-05-10 VITALS — DIASTOLIC BLOOD PRESSURE: 56 MMHG | SYSTOLIC BLOOD PRESSURE: 94 MMHG

## 2022-05-10 VITALS — DIASTOLIC BLOOD PRESSURE: 68 MMHG | SYSTOLIC BLOOD PRESSURE: 104 MMHG

## 2022-05-10 LAB
ALBUMIN SERPL-MCNC: 2 GM/DL (ref 3.2–4.5)
ALP SERPL-CCNC: 398 U/L (ref 40–136)
ALT SERPL-CCNC: 9 U/L (ref 0–55)
BASOPHILS # BLD AUTO: 0 10^3/UL (ref 0–0.1)
BASOPHILS NFR BLD AUTO: 0 % (ref 0–10)
BILIRUB SERPL-MCNC: 1.3 MG/DL (ref 0.1–1)
BUN/CREAT SERPL: 17
CALCIUM SERPL-MCNC: 8.1 MG/DL (ref 8.5–10.1)
CHLORIDE SERPL-SCNC: 108 MMOL/L (ref 98–107)
CO2 SERPL-SCNC: 20 MMOL/L (ref 21–32)
CREAT SERPL-MCNC: 0.64 MG/DL (ref 0.6–1.3)
EOSINOPHIL # BLD AUTO: 0.1 10^3/UL (ref 0–0.3)
EOSINOPHIL NFR BLD AUTO: 0 % (ref 0–10)
GFR SERPLBLD BASED ON 1.73 SQ M-ARVRAT: 90 ML/MIN
GLUCOSE SERPL-MCNC: 111 MG/DL (ref 70–105)
HCT VFR BLD CALC: 33 % (ref 35–52)
HGB BLD-MCNC: 9.7 G/DL (ref 11.5–16)
INR PPP: 1 (ref 0.8–1.4)
LYMPHOCYTES # BLD AUTO: 0.5 10^3/UL (ref 1–4)
LYMPHOCYTES NFR BLD AUTO: 3 % (ref 12–44)
MANUAL DIFFERENTIAL PERFORMED BLD QL: NO
MCH RBC QN AUTO: 25 PG (ref 25–34)
MCHC RBC AUTO-ENTMCNC: 30 G/DL (ref 32–36)
MCV RBC AUTO: 85 FL (ref 80–99)
MONOCYTES # BLD AUTO: 0.3 10^3/UL (ref 0–1)
MONOCYTES NFR BLD AUTO: 2 % (ref 0–12)
NEUTROPHILS # BLD AUTO: 15.3 10^3/UL (ref 1.8–7.8)
NEUTROPHILS NFR BLD AUTO: 93 % (ref 42–75)
PLATELET # BLD: 699 10^3/UL (ref 130–400)
PMV BLD AUTO: 8.6 FL (ref 9–12.2)
POTASSIUM SERPL-SCNC: 4.1 MMOL/L (ref 3.6–5)
PROT SERPL-MCNC: 4.9 GM/DL (ref 6.4–8.2)
PROTHROMBIN TIME: 13.4 SEC (ref 12.2–14.7)
SODIUM SERPL-SCNC: 136 MMOL/L (ref 135–145)
WBC # BLD AUTO: 16.4 10^3/UL (ref 4.3–11)

## 2022-05-10 RX ADMIN — HYDROCODONE BITARTRATE AND ACETAMINOPHEN PRN EA: 5; 325 TABLET ORAL at 19:38

## 2022-05-10 RX ADMIN — FENTANYL CITRATE PRN MCG: 50 INJECTION, SOLUTION INTRAMUSCULAR; INTRAVENOUS at 09:20

## 2022-05-10 RX ADMIN — FENTANYL CITRATE PRN MCG: 50 INJECTION, SOLUTION INTRAMUSCULAR; INTRAVENOUS at 22:43

## 2022-05-10 RX ADMIN — DOCUSATE SODIUM AND SENNOSIDES SCH EA: 8.6; 5 TABLET, FILM COATED ORAL at 07:28

## 2022-05-10 RX ADMIN — ONDANSETRON PRN MG: 2 INJECTION, SOLUTION INTRAMUSCULAR; INTRAVENOUS at 21:09

## 2022-05-10 RX ADMIN — DOCUSATE SODIUM AND SENNOSIDES SCH EA: 8.6; 5 TABLET, FILM COATED ORAL at 19:25

## 2022-05-10 RX ADMIN — HYDROCODONE BITARTRATE AND ACETAMINOPHEN PRN EA: 5; 325 TABLET ORAL at 14:54

## 2022-05-10 NOTE — PROGRESS NOTE
Subjective


Subjective/Events-last exam


Patient having increasing pain this AM. Currently NPO for catheter placement by 

Dr Martinez.


Review of Systems


Gastrointestinal:  Nausea, Abdominal Pain


Neurological:  Weakness, Incoordination





Objective


Exam


Last Set of Vital Signs





Vital Signs








  Date Time  Temp Pulse Resp B/P (MAP) Pulse Ox O2 Delivery O2 Flow Rate FiO2


 


5/10/22 19:29      Nasal Cannula 2.00 


 


5/10/22 13:10 36.8  14 104/68 (80) 100   


 


5/7/22 07:34  97      





Capillary Refill : Less Than 3 Seconds


I&O











Intake and Output 


 


 5/10/22





 00:00


 


Intake Total 1185 ml


 


Output Total 400 ml


 


Balance 785 ml


 


 


 


Intake Oral 1185 ml


 


Output Urine Total 400 ml


 


# Bowel Movements 1








General:  Alert, Oriented X3, No Acute Distress


Lungs:  Clear to Auscultation, Normal Air Movement


Abdomen:  Other (+ distention and moderate ttp, normal bowel sounds)





Results/Procedures


Lab


Laboratory Tests


5/10/22 05:42: 


White Blood Count 16.4H, Red Blood Count 3.87, Hemoglobin 9.7#L, Hematocrit 33L,

Mean Corpuscular Volume 85, Mean Corpuscular Hemoglobin 25, Mean Corpuscular 

Hemoglobin Concent 30L, Red Cell Distribution Width 23.5H, Platelet Count 699H, 

Mean Platelet Volume 8.6L, Immature Granulocyte % (Auto) 2, Neutrophils (%) 

(Auto) 93H, Lymphocytes (%) (Auto) 3L, Monocytes (%) (Auto) 2, Eosinophils (%) 

(Auto) 0, Basophils (%) (Auto) 0, Neutrophils # (Auto) 15.3H, Lymphocytes # 

(Auto) 0.5L, Monocytes # (Auto) 0.3, Eosinophils # (Auto) 0.1, Basophils # 

(Auto) 0.0, Immature Granulocyte # (Auto) 0.2H, Prothrombin Time 13.4, INR 

Comment 1.0, Sodium Level 136, Potassium Level 4.1, Chloride Level 108H, Carbon 

Dioxide Level 20L, Anion Gap 8, Blood Urea Nitrogen 11, Creatinine 0.64, Estimat

Glomerular Filtration Rate 90, BUN/Creatinine Ratio 17, Glucose Level 111H, 

Calcium Level 8.1L, Corrected Calcium 9.7, Total Bilirubin 1.3H, Aspartate Amino

Transf (AST/SGOT) 17, Alanine Aminotransferase (ALT/SGPT) 9, Alkaline Ph

osphatase 398H, Total Protein 4.9L, Albumin 2.0L





Microbiology


5/1/22 Blood Culture - Final, Complete


         No growth


5/1/22 Gram Stain - Final, Complete


         


5/1/22 Body Fluid Culture - Final, Complete


         No growth


4/30/22 Urine Culture - Final, Complete


          NO GROWTH


4/30/22 MRSA Screen - Final, Complete


          MRSA not isolated





Assessment/Plan


Assessment/Plan





(1) Spontaneous bacterial peritonitis


Status:  Acute


Assessment & Plan:  5/9: Continue to cover for infection, cultures thus far are 

negative


5/10: Ctxs continue to be neg





(2) Ascites of liver


Status:  Acute


Assessment & Plan:  5/9: Plan for pleur-ex drain placement tomorrow by Dr Martinez


5/10: Drain placed today, working on SNF placement at d/c





(3) Cirrhosis


Status:  Chronic


Qualifiers:  


   Qualified Codes:  K74.69 - Other cirrhosis of liver


(4) Normocytic anemia


Status:  Acute


Assessment & Plan:  5/9: Stable, no signs of acute bleeding, INR 1.3





(5) Severe protein-calorie malnutrition


Status:  Chronic


(6) H/O malignant neoplasm of breast


(7) Discharge planning issues


Assessment & Plan:  5/9: Plan for SNF placement, PT ordered today, family 

reasonable and understand the advanced stage of disease and have considered 

hospice














CHANTEL GARVEY MD               May 10, 2022 20:46

## 2022-05-10 NOTE — OPERATIVE REPORT
DATE OF SERVICE:  05/10/2022



ATTENDING PRIMARY CARE PHYSICIAN:

Dr. Artem Irwin.



PREOPERATIVE DIAGNOSIS:

Recurrent symptomatic ascites.



POSTPROCEDURE DIAGNOSIS:

Recurrent symptomatic ascites.



PROCEDURE:

Placement of tunneled peritoneal catheter.



SURGEON:

Azalea Sequeira MD



ANESTHESIA:

Monitored anesthesia care with local.



ESTIMATED BLOOD LOSS:

Minimal.



FINDINGS:

Yellow, slightly turbid fluid.



DISPOSITION:

The patient tolerated the procedure well.



INDICATIONS:

The patient is a 77-year-old female who was initially referred over to us for

acid indigestion and gastritis with nausea and vomiting.  This was also

associated with significant weight loss.  An ultrasound was performed, which did

show a stone wedged in the cystic duct consistent with chronic calculous

cholecystitis and she underwent a laparoscopic cholecystectomy on 03/31/2022. 

She presented to the Emergency Department [facility] with a

1-day history of abdominal pain and distention.  She was found to have an

elevated white count of 32.9 and was also found to be anemic with a hemoglobin

of 7.6.  She is also dehydrated.  Since that time, she underwent imaging, which

did show significant ascites.  She underwent paracentesis where a significant

amount of straw yellow transudative fluid was evacuated.  The patient and family

wanted to proceed with skilled nursing facility as well as hospice care and she

does have recurrent ascites and would like to have a tunneled peritoneal

catheter for self-drainage.



DESCRIPTION OF PROCEDURE:

The patient was brought to the operating room, laid supine on the table.  After

adequate IV pain and sedative medications and monitored anesthesia care, the

abdomen was prepped and draped in standard surgical fashion.  Before the

procedure, ultrasound, marked the area of the abdomen in the left lower

abdominal quadrant.  The abdominal wall as well as subcutaneous tissue, muscle

layers as well as the peritoneal lining were anesthetized using 1% lidocaine

with epinephrine.  The skin and subcutaneous tissue was then anesthetized

superior and laterally.  Two incisions were then made and the tunneled

peritoneal catheter was tunneled in the subcutaneous tissue using the tunneler.



The peritoneal catheter was then accessed with cannulating needle over a sheath

withdrawing of turbid yellow fluid.  The cannulating needle removed, and a

guidewire was then inserted without any resistance.  The dilator and sheath were

then placed over the guidewire without any resistance.  The guidewire and the

dilator were then removed, and the peritoneal catheter was placed through the

sheath without any resistance.  The sheath was then removed.  The skin was then

closed using 3-0 nylon interrupted sutures.  The end of the catheter was sutured

to the skin with the same suture.  The catheter was accessed withdrawing of 2

liters of fluid.



The patient tolerated the procedure well.  We will continue with drainage as

necessary.  From our standpoint, she may proceed with a regular diet and be

transferred to skilled nursing facility at any time.





Job ID: [id number]

DocumentID: [id number]

Dictated Date:  05/10/2022 12:51:06

Transcription Date: 05/10/2022 18:29:18

Dictated By: AZALEA SEQUEIRA MD

[facility]

## 2022-05-10 NOTE — PROGRESS NOTE-POST OPERATIVE
Post-Operative Progess Note


Surgeon (s)/Assistant (s)


Surgeon


AZALEA SEQUEIRA MD


Assistant:  none





Pre-Operative Diagnosis


recurrent sx ascites





Post-Operative Diagnosis





same





Procedure & Operative Findings


Date of Procedure


5/10/22


Procedure Performed/Findings


tunneled peritoneal catheter placement.


Anesthesia Type


mac with local





Estimated Blood Loss


Estimated blood loss (mL):  minimal





Specimens/Packing


Specimens Removed


none











AZALEA SEQUEIRA MD                May 10, 2022 12:43

## 2022-05-10 NOTE — ANESTHESIA-GENERAL POST-OP
MAC


Patient Condition


Mental Status/LOC:  Same as Preop


Cardiovascular:  Satisfactory


Nausea/Vomiting:  Absent


Respiratory:  Satisfactory


Pain:  Controlled


Complications:  Absent





Post Op Complications


Complications


None





Follow Up Care/Instructions


Patient Instructions


None needed.





Anesthesiology Discharge Order


Discharge Order


Patient is doing well, no complaints, stable vital signs, no apparent adverse 

anesthesia problems.   


No complications reported per nursing.











RAVINDER LICEA CRNA            May 10, 2022 12:49

## 2022-05-10 NOTE — PROGRESS NOTE-PRE OPERATIVE
Pre-Operative Progress Note


H&P Reviewed


The H&P was reviewed, patient examined and no changes noted.


Date Seen by Provider:  May 10, 2022


Time Seen by Provider:  11:00


Date H&P Reviewed:  Apr 30, 2022


Time H&P Reviewed:  11:00


Pre-Operative Diagnosis:  recurrent sx ascites











AZALEA SEQUEIRA MD                May 10, 2022 11:59

## 2022-05-11 RX ADMIN — FENTANYL CITRATE PRN MCG: 50 INJECTION, SOLUTION INTRAMUSCULAR; INTRAVENOUS at 03:44

## 2022-05-11 RX ADMIN — FENTANYL CITRATE PRN MCG: 50 INJECTION, SOLUTION INTRAMUSCULAR; INTRAVENOUS at 10:50

## 2022-05-11 RX ADMIN — HYDROCODONE BITARTRATE AND ACETAMINOPHEN PRN EA: 5; 325 TABLET ORAL at 13:45

## 2022-05-11 RX ADMIN — HYDROCODONE BITARTRATE AND ACETAMINOPHEN PRN EA: 5; 325 TABLET ORAL at 03:44

## 2022-05-11 RX ADMIN — MELATONIN 3 MG ORAL TABLET PRN MG: 3 TABLET ORAL at 00:27

## 2022-05-11 RX ADMIN — FENTANYL SCH MCG: 12 PATCH TRANSDERMAL at 13:05

## 2022-05-11 RX ADMIN — DOCUSATE SODIUM AND SENNOSIDES SCH EA: 8.6; 5 TABLET, FILM COATED ORAL at 08:29

## 2022-05-11 RX ADMIN — HYDROCODONE BITARTRATE AND ACETAMINOPHEN PRN EA: 5; 325 TABLET ORAL at 08:29

## 2022-05-11 NOTE — DISCHARGE INST-SKILLED NURSING
Discharge Inst-Skilled NF


Reconcile Patient Problems


Problems Reviewed?:  Yes





Consult/Follow Up/Orders


Skilled NF Admit to:  [facility]


Certification (SNF)


I certify that SNF services are required to be given on an inpatient basis 

because of the above named patient's need for skilled nursing care on a 

continuing basis for the conditions(s) for which he/she was receiving inpatient 

hospital services prior to his/her transfer to the SNF.


Skilled Nursing Facility Order:  Nursing Services, Occupational Ther-Evaluate & 

Treat, Physical Therapy-Evaluate & Treat, Other (drain peritoneal catheter when 

symptomatic(recurrent ascites with abd distention)until asymptomatic PRN)


Oxygen Delivery Method:  Nasal Cannula


Discharge Diet:  No Restrictions


Daily Activity as Tolerated:  Yes


Type of Care:  Comfort Measures





New & Resume Previous Orders


Other Instructions


Follow up with dr. sequeira in 2 weeks, call office for appt([phone]).


wound/drain care: gauze followed by large opsite to cover entry site and 

drainage catheter daily.


Azalea Sequeira 


May 11, 2022 


10:07











AZALEA SEQUEIRA MD                May 11, 2022 10:12

## 2022-05-11 NOTE — PROGRESS NOTE
Subjective


Subjective/Events-last exam


Patient feeling much better this AM after procedure and she is ready to go to 

SNF


Review of Systems


Gastrointestinal:  Abdominal Pain


Neurological:  Weakness, Incoordination





Objective


Exam


Last Set of Vital Signs





Vital Signs








  Date Time  Temp Pulse Resp B/P (MAP) Pulse Ox O2 Delivery O2 Flow Rate FiO2


 


5/11/22 14:48        


 


5/11/22 10:15      Nasal Cannula  


 


5/11/22 08:37       2.00 


 


5/10/22 13:10 36.8  14  100   


 


5/7/22 07:34  97      





Capillary Refill : Less Than 3 Seconds


I&O











Intake and Output 


 


 5/11/22





 00:00


 


Intake Total 1270 ml


 


Output Total 300 ml


 


Balance 970 ml


 


 


 


Intake Oral 270 ml


 


IV Total 1000 ml


 


Output Urine Total 300 ml








General:  Alert, Oriented X3, No Acute Distress


Lungs:  Clear to Auscultation, Normal Air Movement


Heart:  Regular Rate, No Murmurs


Abdomen:  Normal Bowel Sounds, Soft, Other (mild ttp, no rebound or guarding)





Results/Procedures


Lab





Microbiology


5/1/22 Blood Culture - Final, Complete


         No growth


5/1/22 Gram Stain - Final, Complete


         


5/1/22 Body Fluid Culture - Final, Complete


         No growth


4/30/22 Urine Culture - Final, Complete


          NO GROWTH


4/30/22 MRSA Screen - Final, Complete


          MRSA not isolated





Assessment/Plan


Assessment/Plan





(1) Spontaneous bacterial peritonitis


Status:  Acute


Assessment & Plan:  5/9: Continue to cover for infection, cultures thus far are 

negative


5/10: Ctxs continue to be neg





(2) Ascites of liver


Status:  Acute


Assessment & Plan:  5/9: Plan for pleur-ex drain placement tomorrow by Dr Martinez


5/10: Drain placed today, working on SNF placement at d/c


5/11: Drain in place, pain is better controlled, ready for d/c today for SNF





(3) Cirrhosis


Status:  Chronic


Qualifiers:  


   Qualified Codes:  K74.69 - Other cirrhosis of liver


(4) Normocytic anemia


Status:  Acute


Assessment & Plan:  5/9: Stable, no signs of acute bleeding, INR 1.3





(5) Severe protein-calorie malnutrition


Status:  Chronic


(6) H/O malignant neoplasm of breast


(7) Discharge planning issues


Assessment & Plan:  5/9: Plan for SNF placement, PT ordered today, family 

reasonable and understand the advanced stage of disease and have considered 

hospice














GAULT,CHANTEL R MD               May 11, 2022 18:22